# Patient Record
Sex: FEMALE | Race: BLACK OR AFRICAN AMERICAN | Employment: FULL TIME | URBAN - METROPOLITAN AREA
[De-identification: names, ages, dates, MRNs, and addresses within clinical notes are randomized per-mention and may not be internally consistent; named-entity substitution may affect disease eponyms.]

---

## 2017-01-23 RX ORDER — VALSARTAN 80 MG/1
TABLET ORAL
Qty: 30 TAB | Refills: 6 | Status: SHIPPED | OUTPATIENT
Start: 2017-01-23 | End: 2017-09-18 | Stop reason: SDUPTHER

## 2017-05-04 ENCOUNTER — OFFICE VISIT (OUTPATIENT)
Dept: CARDIOLOGY CLINIC | Age: 62
End: 2017-05-04

## 2017-05-04 VITALS
HEIGHT: 63 IN | DIASTOLIC BLOOD PRESSURE: 61 MMHG | WEIGHT: 132 LBS | HEART RATE: 70 BPM | BODY MASS INDEX: 23.39 KG/M2 | SYSTOLIC BLOOD PRESSURE: 116 MMHG

## 2017-05-04 DIAGNOSIS — Z98.61 POSTSURGICAL PERCUTANEOUS TRANSLUMINAL CORONARY ANGIOPLASTY STATUS: ICD-10-CM

## 2017-05-04 DIAGNOSIS — I10 ESSENTIAL HYPERTENSION, BENIGN: ICD-10-CM

## 2017-05-04 DIAGNOSIS — I25.10 ATHEROSCLEROSIS OF NATIVE CORONARY ARTERY OF NATIVE HEART WITHOUT ANGINA PECTORIS: Primary | ICD-10-CM

## 2017-05-04 DIAGNOSIS — E78.00 PURE HYPERCHOLESTEROLEMIA: ICD-10-CM

## 2017-05-04 DIAGNOSIS — F17.200 TOBACCO USE DISORDER: ICD-10-CM

## 2017-05-04 NOTE — PATIENT INSTRUCTIONS
Medications Discontinued During This Encounter   Medication Reason    carvedilol (COREG) 12.5 mg tablet Discontinued by Another Clinician       No orders of the defined types were placed in this encounter. Stopping Smoking: Care Instructions  Your Care Instructions  Cigarette smokers crave the nicotine in cigarettes. Giving it up is much harder than simply changing a habit. Your body has to stop craving the nicotine. It is hard to quit, but you can do it. There are many tools that people use to quit smoking. You may find that combining tools works best for you. There are several steps to quitting. First you get ready to quit. Then you get support to help you. After that, you learn new skills and behaviors to become a nonsmoker. For many people, a necessary step is getting and using medicine. Your doctor will help you set up the plan that best meets your needs. You may want to attend a smoking cessation program to help you quit smoking. When you choose a program, look for one that has proven success. Ask your doctor for ideas. You will greatly increase your chances of success if you take medicine as well as get counseling or join a cessation program.  Some of the changes you feel when you first quit tobacco are uncomfortable. Your body will miss the nicotine at first, and you may feel short-tempered and grumpy. You may have trouble sleeping or concentrating. Medicine can help you deal with these symptoms. You may struggle with changing your smoking habits and rituals. The last step is the tricky one: Be prepared for the smoking urge to continue for a time. This is a lot to deal with, but keep at it. You will feel better. Follow-up care is a key part of your treatment and safety. Be sure to make and go to all appointments, and call your doctor if you are having problems. Its also a good idea to know your test results and keep a list of the medicines you take. How can you care for yourself at home?   · Ask your family, friends, and coworkers for support. You have a better chance of quitting if you have help and support. · Join a support group, such as Nicotine Anonymous, for people who are trying to quit smoking. · Consider signing up for a smoking cessation program, such as the American Lung Association's Freedom from Smoking program.  · Set a quit date. Pick your date carefully so that it is not right in the middle of a big deadline or stressful time. Once you quit, do not even take a puff. Get rid of all ashtrays and lighters after your last cigarette. Clean your house and your clothes so that they do not smell of smoke. · Learn how to be a nonsmoker. Think about ways you can avoid those things that make you reach for a cigarette. ¨ Avoid situations that put you at greatest risk for smoking. For some people, it is hard to have a drink with friends without smoking. For others, they might skip a coffee break with coworkers who smoke. ¨ Change your daily routine. Take a different route to work or eat a meal in a different place. · Cut down on stress. Calm yourself or release tension by doing an activity you enjoy, such as reading a book, taking a hot bath, or gardening. · Talk to your doctor or pharmacist about nicotine replacement therapy, which replaces the nicotine in your body. You still get nicotine but you do not use tobacco. Nicotine replacement products help you slowly reduce the amount of nicotine you need. These products come in several forms, many of them available over-the-counter:  ¨ Nicotine patches  ¨ Nicotine gum and lozenges  ¨ Nicotine inhaler  · Ask your doctor about bupropion (Wellbutrin) or varenicline (Chantix), which are prescription medicines. They do not contain nicotine. They help you by reducing withdrawal symptoms, such as stress and anxiety. · Some people find hypnosis, acupuncture, and massage helpful for ending the smoking habit. · Eat a healthy diet and get regular exercise. Having healthy habits will help your body move past its craving for nicotine. · Be prepared to keep trying. Most people are not successful the first few times they try to quit. Do not get mad at yourself if you smoke again. Make a list of things you learned and think about when you want to try again, such as next week, next month, or next year. Where can you learn more? Go to http://velma-michelle.info/. Enter S161 in the search box to learn more about \"Stopping Smoking: Care Instructions. \"  Current as of: May 26, 2016  Content Version: 11.2  © 1722-5865 [employer]. Care instructions adapted under license by [employer] (which disclaims liability or warranty for this information). If you have questions about a medical condition or this instruction, always ask your healthcare professional. [address] any warranty or liability for your use of this information.

## 2017-05-04 NOTE — LETTER
Audimary Wilson 1955 5/4/2017 Dear Jessee Ríos MD 
 
I had the pleasure of evaluating  Ms. Lani Burris in office today. Below are the relevant portions of my assessment and plan of care. ICD-10-CM ICD-9-CM 1. Atherosclerosis of native coronary artery of native heart without angina pectoris I25.10 414.01   
 5/17; 11/16 Stable symptoms; rare angina- once in 4-5 months 2. Essential hypertension, benign I10 401.1 Controlled Patient taking valsartan HCT. This was not a number of list and so was advised to stop taking it 3. Pure hypercholesterolemia E78.00 272.0   
 12/16 LDL63; 7/15 LDL76;  
4. Tobacco use disorder F17.200 305.1 Patient advised to stop smoking to reduce future cardiovascular events. 5. Postsurgical percutaneous transluminal coronary angioplasty status Z98.61 V45.82   
 10/12 ALBA LCx Current Outpatient Prescriptions Medication Sig Dispense Refill  valsartan (DIOVAN) 80 mg tablet TAKE 1 TABLET DAILY 30 Tab 6  
 aspirin delayed-release 81 mg tablet Take 1 Tab by mouth daily. (Patient taking differently: Take 81 mg by mouth daily. 1 tablet once a week) 100 Tab prn  NITROSTAT 0.4 mg SL tablet DISSOLVE 1 TABLET UNDER TONGUE EVERY FIVE MINUTES AS NEEDED FOR CHEST PAIN FOR UP TO 3 DOSES 25 Tab 1  
 diltiazem (TAZTIA XT) 240 mg SR capsule Take 240 mg by mouth daily.  fenofibrate (LOFIBRA) 160 mg tablet Take 160 mg by mouth daily.  isosorbide mononitrate ER (IMDUR) 30 mg tablet Take  by mouth daily.  pravastatin (PRAVACHOL) 40 mg tablet Take 1 Tab by mouth nightly. 30 Tab 6  
 albuterol (VENTOLIN HFA) 90 mcg/actuation inhaler Take  by inhalation. No orders of the defined types were placed in this encounter. If you have questions, please do not hesitate to call me. I look forward to following Ms. Lani Burris along with you. Sincerely, Adam Puente MD

## 2017-05-04 NOTE — PROGRESS NOTES
1. Have you been to the ER, urgent care clinic since your last visit? Hospitalized since your last visit? No    2. Have you seen or consulted any other health care providers outside of the [address] [place] [address] since your last visit? Include any pap smears or colon screening. No     3. Since your last visit, have you had any of the following symptoms? .         4. Have you had any blood work, X-rays or cardiac testing? No              5.  Where do you normally have your labs drawn? [facility]    6. Do you need any refills today?    No

## 2017-05-04 NOTE — MR AVS SNAPSHOT
Visit Information Date & Time Provider Department Dept. Phone Encounter #  
 5/4/2017 10:45 AM Carol Jordan MD [facility] [phone] Follow-up Instructions Return in about 6 months (around 11/4/2017), or if symptoms worsen or fail to improve. Upcoming Health Maintenance Date Due Hepatitis C Screening 1955 Pneumococcal 19-64 Medium Risk (1 of 1 - PPSV23) 8/17/1972 DTaP/Tdap/Td series (1 - Tdap) 8/17/1974 PAP AKA CERVICAL CYTOLOGY 8/17/1974 BREAST CANCER SCRN MAMMOGRAM 8/17/2003 FOBT Q 1 YEAR AGE 50-75 8/17/2003 ZOSTER VACCINE AGE 60> 8/17/2013 INFLUENZA AGE 9 TO ADULT 8/1/2017 Allergies as of 5/4/2017  Review Complete On: 5/4/2017 By: Carol Jordan MD  
  
 Severity Noted Reaction Type Reactions Codeine  10/26/2012    Other (comments) States cannot remember it has been so long Current Immunizations  Never Reviewed No immunizations on file. Not reviewed this visit You Were Diagnosed With   
  
 Codes Comments Atherosclerosis of native coronary artery of native heart without angina pectoris    -  Primary ICD-10-CM: I25.10 ICD-9-CM: 414.01 5/17; 11/16 Stable symptoms; rare angina- once in 4-5 months Essential hypertension, benign     ICD-10-CM: I10 
ICD-9-CM: 401.1 Controlled Patient taking valsartan HCT. This was not a number of list and so was advised to stop taking it Pure hypercholesterolemia     ICD-10-CM: E78.00 ICD-9-CM: 272.0 12/16 LDL63; 7/15 LDL76;  
 Tobacco use disorder     ICD-10-CM: P52.205 ICD-9-CM: 305.1 Patient advised to stop smoking to reduce future cardiovascular events. Postsurgical percutaneous transluminal coronary angioplasty status     ICD-10-CM: Z98.61 ICD-9-CM: V45.82 10/12 ALBA LCx Vitals BP Pulse Height(growth percentile) Weight(growth percentile) BMI Smoking Status 116/61 70 5' 3\" (1.6 m) 132 lb (59.9 kg) 23.38 kg/m2 Current Every Day Smoker Vitals History BMI and BSA Data Body Mass Index Body Surface Area  
 23.38 kg/m 2 1.63 m 2 Preferred Pharmacy Pharmacy Name Phone [facility][id number] [address] Your Updated Medication List  
  
   
This list is accurate as of: 5/4/17 12:00 PM.  Always use your most recent med list.  
  
  
  
  
 aspirin delayed-release 81 mg tablet Take 1 Tab by mouth daily. fenofibrate 160 mg tablet Commonly known as:  LOFIBRA Take 160 mg by mouth daily. isosorbide mononitrate ER 30 mg tablet Commonly known as:  IMDUR Take  by mouth daily. NITROSTAT 0.4 mg SL tablet Generic drug:  nitroglycerin DISSOLVE 1 TABLET UNDER TONGUE EVERY FIVE MINUTES AS NEEDED FOR CHEST PAIN FOR UP TO 3 DOSES  
  
 pravastatin 40 mg tablet Commonly known as:  PRAVACHOL Take 1 Tab by mouth nightly. TAZTIA  mg SR capsule Generic drug:  diltiazem Take 240 mg by mouth daily. valsartan 80 mg tablet Commonly known as:  DIOVAN  
TAKE 1 TABLET DAILY VENTOLIN HFA 90 mcg/actuation inhaler Generic drug:  albuterol Take  by inhalation. Follow-up Instructions Return in about 6 months (around 11/4/2017), or if symptoms worsen or fail to improve. Patient Instructions Medications Discontinued During This Encounter Medication Reason  carvedilol (COREG) 12.5 mg tablet Discontinued by Another Clinician No orders of the defined types were placed in this encounter. Stopping Smoking: Care Instructions Your Care Instructions Cigarette smokers crave the nicotine in cigarettes. Giving it up is much harder than simply changing a habit. Your body has to stop craving the nicotine. It is hard to quit, but you can do it.  There are many tools that people use to quit smoking. You may find that combining tools works best for you. There are several steps to quitting. First you get ready to quit. Then you get support to help you. After that, you learn new skills and behaviors to become a nonsmoker. For many people, a necessary step is getting and using medicine. Your doctor will help you set up the plan that best meets your needs. You may want to attend a smoking cessation program to help you quit smoking. When you choose a program, look for one that has proven success. Ask your doctor for ideas. You will greatly increase your chances of success if you take medicine as well as get counseling or join a cessation program. 
Some of the changes you feel when you first quit tobacco are uncomfortable. Your body will miss the nicotine at first, and you may feel short-tempered and grumpy. You may have trouble sleeping or concentrating. Medicine can help you deal with these symptoms. You may struggle with changing your smoking habits and rituals. The last step is the tricky one: Be prepared for the smoking urge to continue for a time. This is a lot to deal with, but keep at it. You will feel better. Follow-up care is a key part of your treatment and safety. Be sure to make and go to all appointments, and call your doctor if you are having problems. Its also a good idea to know your test results and keep a list of the medicines you take. How can you care for yourself at home? · Ask your family, friends, and coworkers for support. You have a better chance of quitting if you have help and support. · Join a support group, such as Nicotine Anonymous, for people who are trying to quit smoking. · Consider signing up for a smoking cessation program, such as the American Lung Association's Freedom from Smoking program. 
· Set a quit date. Pick your date carefully so that it is not right in the middle of a big deadline or stressful time.  Once you quit, do not even take a puff. Get rid of all ashtrays and lighters after your last cigarette. Clean your house and your clothes so that they do not smell of smoke. · Learn how to be a nonsmoker. Think about ways you can avoid those things that make you reach for a cigarette. ¨ Avoid situations that put you at greatest risk for smoking. For some people, it is hard to have a drink with friends without smoking. For others, they might skip a coffee break with coworkers who smoke. ¨ Change your daily routine. Take a different route to work or eat a meal in a different place. · Cut down on stress. Calm yourself or release tension by doing an activity you enjoy, such as reading a book, taking a hot bath, or gardening. · Talk to your doctor or pharmacist about nicotine replacement therapy, which replaces the nicotine in your body. You still get nicotine but you do not use tobacco. Nicotine replacement products help you slowly reduce the amount of nicotine you need. These products come in several forms, many of them available over-the-counter: ¨ Nicotine patches ¨ Nicotine gum and lozenges ¨ Nicotine inhaler · Ask your doctor about bupropion (Wellbutrin) or varenicline (Chantix), which are prescription medicines. They do not contain nicotine. They help you by reducing withdrawal symptoms, such as stress and anxiety. · Some people find hypnosis, acupuncture, and massage helpful for ending the smoking habit. · Eat a healthy diet and get regular exercise. Having healthy habits will help your body move past its craving for nicotine. · Be prepared to keep trying. Most people are not successful the first few times they try to quit. Do not get mad at yourself if you smoke again. Make a list of things you learned and think about when you want to try again, such as next week, next month, or next year. Where can you learn more? Go to http://velma-michelle.info/. Enter N113 in the search box to learn more about \"Stopping Smoking: Care Instructions. \" Current as of: May 26, 2016 Content Version: 11.2 © 6515-8634 [employer], [employer]. Care instructions adapted under license by [employer] (which disclaims liability or warranty for this information). If you have questions about a medical condition or this instruction, always ask your healthcare professional. [address] any warranty or liability for your use of this information. Introducing [facility]! Ender Melo introduces [employer]SP Tooling patient portal. Now you can access parts of your medical record, email your doctor's office, and request medication refills online. 1. In your internet browser, go to [email or web address] ([email or web address])/[email or web address] Planet 2. Click on the First Time User? Click Here link in the Sign In box. You will see the New Member Sign Up page. 3. Enter your R[employer]ing Access Code exactly as it appears below. You will not need to use this code after youve completed the sign-up process. If you do not sign up before the expiration date, you must request a new code. · [employer] Access Code: [email or web address] Expires: 8/2/2017 10:55 AM 
 
4. Enter the last four digits of your Social Security Number (xxxx) and Date of Birth (mm/dd/yyyy) as indicated and click Submit. You will be taken to the next sign-up page. 5. Create a RSP Tooling ID. This will be your [employer] login ID and cannot be changed, so think of one that is secure and easy to remember. 6. Create a [employer] password. You can change your password at any time. 7. Enter your Password Reset Question and Answer. This can be used at a later time if you forget your password. 8. Enter your e-mail address. You will receive e-mail notification when new information is available in [address]. 9. Click Sign Up. You can now view and download portions of your medical record. 10. Click the Download Summary menu link to download a portable copy of your medical information. If you have questions, please visit the Frequently Asked Questions section of the Quidsi website. Remember, Quidsi is NOT to be used for urgent needs. For medical emergencies, dial 911. Now available from your iPhone and Android! Please provide this summary of care documentation to your next provider. Your primary care clinician is listed as Patty Gar. If you have any questions after today's visit, please call [phone].

## 2017-05-04 NOTE — PROGRESS NOTES
HISTORY OF PRESENT ILLNESS  Mal Martinez is a 59 y.o. female. HPI Comments: F/u CAD, Old PCI, HTN, HLD  5/17; 11/16  Patient denies significant chest pain, SOB, palpitations, edema, dizziness      Hypertension   The history is provided by the medical records. This is a chronic problem. Pertinent negatives include no chest pain, no headaches and no shortness of breath. Cholesterol Problem   The history is provided by the medical records. This is a chronic problem. Pertinent negatives include no chest pain, no headaches and no shortness of breath. CHF   The history is provided by the medical records. This is a chronic problem. Pertinent negatives include no chest pain, no headaches and no shortness of breath. Review of Systems   Constitutional: Negative for chills, fever, malaise/fatigue and weight loss. HENT: Negative for nosebleeds. Eyes: Negative for discharge. Respiratory: Negative for cough, shortness of breath and wheezing. Cardiovascular: Negative for chest pain, palpitations, orthopnea, claudication, leg swelling and PND. Gastrointestinal: Negative for diarrhea, nausea and vomiting. Genitourinary: Negative for dysuria and hematuria. Musculoskeletal: Negative for joint pain. Skin: Negative for rash. Neurological: Negative for dizziness, seizures, loss of consciousness and headaches. Endo/Heme/Allergies: Negative for polydipsia. Does not bruise/bleed easily. Psychiatric/Behavioral: Negative for depression and substance abuse. The patient does not have insomnia.       Allergies   Allergen Reactions    Codeine Other (comments)     States cannot remember it has been so long         Past Medical History:   Diagnosis Date    CAD (coronary artery disease)     Chronic diastolic heart failure (HCC)     Stable symptoms    Congestive heart failure, unspecified     Essential hypertension     Myocardial infarction ([facility] [address]     10/12 NSTEMI    Other and unspecified hyperlipidemia 8/12 LDL 91    Postsurgical percutaneous transluminal coronary angioplasty status     Tobacco use disorder     Weight loss, unintentional 7/27/2015    chk thyroid, cmp        Family History   Problem Relation Age of Onset    Heart Failure Mother      CHF in 45s    Stroke Mother      CVA in 46s    Heart Attack Neg Hx     Sudden Death Neg Hx        Social History   Substance Use Topics    Smoking status: Current Every Day Smoker     Packs/day: 0.50    Smokeless tobacco: Never Used      Comment: Smokes <1 pack of cigarettes per day; 10/12 - 3 cig/day    Alcohol use Yes      Comment: Occasional alcohol use;        Current Outpatient Prescriptions   Medication Sig    valsartan (DIOVAN) 80 mg tablet TAKE 1 TABLET DAILY    aspirin delayed-release 81 mg tablet Take 1 Tab by mouth daily. (Patient taking differently: Take 81 mg by mouth daily. 1 tablet once a week)    NITROSTAT 0.4 mg SL tablet DISSOLVE 1 TABLET UNDER TONGUE EVERY FIVE MINUTES AS NEEDED FOR CHEST PAIN FOR UP TO 3 DOSES    diltiazem (TAZTIA XT) 240 mg SR capsule Take 240 mg by mouth daily.  fenofibrate (LOFIBRA) 160 mg tablet Take 160 mg by mouth daily.  isosorbide mononitrate ER (IMDUR) 30 mg tablet Take  by mouth daily.  pravastatin (PRAVACHOL) 40 mg tablet Take 1 Tab by mouth nightly.  albuterol (VENTOLIN HFA) 90 mcg/actuation inhaler Take  by inhalation. No current facility-administered medications for this visit.          Past Surgical History:   Procedure Laterality Date    HX CORONARY STENT PLACEMENT      ALBA mid LCx    HX HEART CATHETERIZATION         Diagnostic Studies:  CARDIOLOGY STUDIES 10/1/2012 8/1/2012 4/1/2012   EKG Result - - SR, Poor R wave progression, no ac ST-T changes   Myocardial Perfusion Scan Result - nl scan, nl EF -   Cardiac Cath Result 70% D1, 99% mid LCx, 70% OM2, 50% distal RCA, ALBA mid LCx - -   Cardiac Cath with PCI Result ALBA LCx - -   Echocardiogram - Complete Result EF 65%, mild DD EF 65%, mild DD, trace MR -       Visit Vitals    /61    Pulse 70    Ht 5' 3\" (1.6 m)    Wt 59.9 kg (132 lb)    BMI 23.38 kg/m2       Ms. Maximiliano Hancock has a reminder for a \"due or due soon\" health maintenance. I have asked that she contact her primary care provider for follow-up on this health maintenance. Physical Exam   Constitutional: She is oriented to person, place, and time. She appears well-developed and well-nourished. No distress. HENT:   Head: Normocephalic and atraumatic. Mouth/Throat: Normal dentition. Eyes: Right eye exhibits no discharge. Left eye exhibits no discharge. No scleral icterus. Neck: Neck supple. No JVD present. Carotid bruit is not present. No thyromegaly present. Cardiovascular: Normal rate, regular rhythm, S1 normal, S2 normal, normal heart sounds and intact distal pulses. Exam reveals no gallop and no friction rub. No murmur heard. Pulmonary/Chest: Effort normal. She has wheezes (minimal b/l diffuse exp). She has no rales. Abdominal: Soft. She exhibits no mass. There is no tenderness. Musculoskeletal: She exhibits no edema. Lymphadenopathy:        Right cervical: No superficial cervical adenopathy present. Left cervical: No superficial cervical adenopathy present. Neurological: She is alert and oriented to person, place, and time. Skin: Skin is warm and dry. No rash noted. Psychiatric: She has a normal mood and affect. Her behavior is normal.       ASSESSMENT and Lazarus Koffi was seen today for coronary artery disease, hypertension and cholesterol problem. Diagnoses and all orders for this visit:    Atherosclerosis of native coronary artery of native heart without angina pectoris  Comments:  5/17; 11/16 Stable symptoms; rare angina- once in 4-5 months      Essential hypertension, benign  Comments:  Controlled  Patient taking valsartan HCT.   This was not a number of list and so was advised to stop taking it    Pure hypercholesterolemia  Comments:  12/16 LDL63; 7/15 LDL76;    Tobacco use disorder  Comments:  Patient advised to stop smoking to reduce future cardiovascular events. Postsurgical percutaneous transluminal coronary angioplasty status  Comments:  10/12 ALBA LCx          Pertinent laboratory and test data reviewed and discussed with patient. See patient instructions also for other medical advice given    Medications Discontinued During This Encounter   Medication Reason    carvedilol (COREG) 12.5 mg tablet Discontinued by Another Clinician       Follow-up Disposition:  Return in about 6 months (around 11/4/2017), or if symptoms worsen or fail to improve.

## 2017-09-18 RX ORDER — VALSARTAN 80 MG/1
TABLET ORAL
Qty: 30 TAB | Refills: 6 | Status: SHIPPED | OUTPATIENT
Start: 2017-09-18 | End: 2018-01-26 | Stop reason: SDUPTHER

## 2018-01-02 ENCOUNTER — OFFICE VISIT (OUTPATIENT)
Dept: CARDIOLOGY CLINIC | Age: 63
End: 2018-01-02

## 2018-01-02 VITALS
WEIGHT: 135 LBS | HEART RATE: 70 BPM | DIASTOLIC BLOOD PRESSURE: 66 MMHG | HEIGHT: 63 IN | SYSTOLIC BLOOD PRESSURE: 176 MMHG | BODY MASS INDEX: 23.92 KG/M2

## 2018-01-02 DIAGNOSIS — I50.32 CHRONIC DIASTOLIC HEART FAILURE (HCC): Primary | ICD-10-CM

## 2018-01-02 DIAGNOSIS — I25.10 ATHEROSCLEROSIS OF NATIVE CORONARY ARTERY OF NATIVE HEART WITHOUT ANGINA PECTORIS: ICD-10-CM

## 2018-01-02 DIAGNOSIS — F17.200 TOBACCO USE DISORDER: ICD-10-CM

## 2018-01-02 DIAGNOSIS — Z98.61 POSTSURGICAL PERCUTANEOUS TRANSLUMINAL CORONARY ANGIOPLASTY STATUS: ICD-10-CM

## 2018-01-02 DIAGNOSIS — E78.00 PURE HYPERCHOLESTEROLEMIA: ICD-10-CM

## 2018-01-02 DIAGNOSIS — I10 ESSENTIAL HYPERTENSION, BENIGN: ICD-10-CM

## 2018-01-02 RX ORDER — BUPROPION HYDROCHLORIDE 150 MG/1
150 TABLET, EXTENDED RELEASE ORAL 2 TIMES DAILY
Qty: 60 TAB | Refills: 2 | Status: SHIPPED | OUTPATIENT
Start: 2018-01-02 | End: 2018-07-13 | Stop reason: ALTCHOICE

## 2018-01-02 RX ORDER — IBUPROFEN 800 MG/1
TABLET ORAL
COMMUNITY
End: 2018-07-13

## 2018-01-02 RX ORDER — DICLOFENAC SODIUM 100 MG/1
100 TABLET, FILM COATED, EXTENDED RELEASE ORAL DAILY
COMMUNITY
End: 2018-01-26

## 2018-01-02 RX ORDER — FUROSEMIDE 20 MG/1
TABLET ORAL AS NEEDED
COMMUNITY
End: 2018-07-13 | Stop reason: ALTCHOICE

## 2018-01-02 RX ORDER — IBUPROFEN 200 MG
1 TABLET ORAL EVERY 24 HOURS
COMMUNITY
End: 2018-07-13

## 2018-01-02 NOTE — PROGRESS NOTES
1. Have you been to the ER, urgent care clinic since your last visit? Hospitalized since your last visit? Yes Where: [facility]/Shortness of breath    2. Have you seen or consulted any other health care providers outside of the [facility] since your last visit? Include any pap smears or colon screening. No     3. Since your last visit, have you had any of the following symptoms? shortness of breath. 4.  Have you had any blood work, X-rays or cardiac testing? Yes Where: [facility] Reason for visit: Labs             5.  Where do you normally have your labs drawn? [facility]    6. Do you need any refills today?    No

## 2018-01-02 NOTE — LETTER
Mago Booker 1955 1/2/2018 Dear Lisa Noguera MD 
 
I had the pleasure of evaluating  Ms. Pal Bee in office today. Below are the relevant portions of my assessment and plan of care. ICD-10-CM ICD-9-CM 1. Chronic diastolic heart failure (HCC) I50.32 428.32 2D ECHO COMPLETE ADULT (TTE) SCHEDULE NUCLEAR STUDY  
 recent er visit with increase sob 
better after that 2. Atherosclerosis of native coronary artery of native heart without angina pectoris I25.10 414.01 2D ECHO COMPLETE ADULT (TTE) SCHEDULE NUCLEAR STUDY  
 stab;e 
exertional 
sob 3. Essential hypertension, benign I10 401.1   
 elevated 
monitor at home 4. Pure hypercholesterolemia E78.00 272.0   
 on statin 5. Postsurgical percutaneous transluminal coronary angioplasty status Z98.61 V45.82   
6. Tobacco use disorder F17.200 305.1   
 discussed cessation Current Outpatient Prescriptions Medication Sig Dispense Refill  furosemide (LASIX) 20 mg tablet Take  by mouth daily.  nicotine (NICODERM CQ) 14 mg/24 hr patch 1 Patch by TransDERmal route every twenty-four (24) hours.  ibuprofen (MOTRIN) 800 mg tablet Take  by mouth.  diclofenac (VOLTAREN-XR) 100 mg ER tablet Take 100 mg by mouth daily.  buPROPion SR (WELLBUTRIN SR) 150 mg SR tablet Take 1 Tab by mouth two (2) times a day. 60 Tab 2  
 valsartan (DIOVAN) 80 mg tablet TAKE 1 TABLET BY MOUTH DAILY 30 Tab 6  
 aspirin delayed-release 81 mg tablet Take 1 Tab by mouth daily. 100 Tab prn  NITROSTAT 0.4 mg SL tablet DISSOLVE 1 TABLET UNDER TONGUE EVERY FIVE MINUTES AS NEEDED FOR CHEST PAIN FOR UP TO 3 DOSES 25 Tab 1  
 diltiazem (TAZTIA XT) 240 mg SR capsule Take 240 mg by mouth daily.  fenofibrate (LOFIBRA) 160 mg tablet Take 160 mg by mouth daily.  isosorbide mononitrate ER (IMDUR) 30 mg tablet Take  by mouth daily.  pravastatin (PRAVACHOL) 40 mg tablet Take 1 Tab by mouth nightly. 30 Tab 6  albuterol (VENTOLIN HFA) 90 mcg/actuation inhaler Take  by inhalation. Orders Placed This Encounter  SCHEDULE NUCLEAR STUDY  
  lexiscan stress test  
  Standing Status:   Future Standing Expiration Date:   2019  2D ECHO COMPLETE ADULT (TTE) Standing Status:   Future Standing Expiration Date:   2018 Order Specific Question:   Reason for Exam: Answer:   see diagnosis  furosemide (LASIX) 20 mg tablet Sig: Take  by mouth daily.  nicotine (NICODERM CQ) 14 mg/24 hr patch Si Patch by TransDERmal route every twenty-four (24) hours.  ibuprofen (MOTRIN) 800 mg tablet Sig: Take  by mouth.  diclofenac (VOLTAREN-XR) 100 mg ER tablet Sig: Take 100 mg by mouth daily.  buPROPion SR (WELLBUTRIN SR) 150 mg SR tablet Sig: Take 1 Tab by mouth two (2) times a day. Dispense:  60 Tab Refill:  2 If you have questions, please do not hesitate to call me. I look forward to following MsBarbie Darrel Kerr along with you. Sincerely, Carvin Ganser, MD

## 2018-01-02 NOTE — MR AVS SNAPSHOT
Visit Information Date & Time Provider Department Dept. Phone Encounter #  
 1/2/2018  9:15 AM Jeanette Palacios MD [facility] [phone] [id number] Follow-up Instructions Return for follow up with Dr Nancy Jon, F/u after tests. Your Appointments 1/2/2018  9:15 AM  
Follow Up with Jeanette Palacios MD  
[facility] ([facility]) Appt Note: h/f sentara for sob, chf patient of dr. Gavino Almanza need see doctor soon; h/f sentara for sob, chf patient of dr. Gavino Almanza need see doctor soon [address] [facility] [address]  
  
   
 [address] [place] [address]  
  
    
 1/15/2018  8:00 AM  
PROCEDURE with CA NUC [facility] ([facility]) Appt Note: deonna/echo jarvis [address] [facility] [address]  
  
   
 [address] [facility]  
  
    
 1/15/2018  8:30 AM  
PROCEDURE with CA ECHO [facility] ([facility]) Appt Note: echo/deonna/jarvis [address] [facility] [address]  
  
   
 [address] [place] [address] Upcoming Health Maintenance Date Due Hepatitis C Screening 1955 Pneumococcal 19-64 Medium Risk (1 of 1 - PPSV23) 8/17/1972 DTaP/Tdap/Td series (1 - Tdap) 8/17/1974 PAP AKA CERVICAL CYTOLOGY 8/17/1974 FOBT Q 1 YEAR AGE 50-75 8/17/2003 ZOSTER VACCINE AGE 60> 6/17/2013 Influenza Age 5 to Adult 8/1/2017 Allergies as of 1/2/2018  Review Complete On: 1/2/2018 By: Jeanette Palacios MD  
  
 Severity Noted Reaction Type Reactions Codeine  10/26/2012    Other (comments) States cannot remember it has been so long Current Immunizations  Never Reviewed No immunizations on file. Not reviewed this visit You Were Diagnosed With   
  
 Codes Comments  Chronic diastolic heart failure (HCC)    -  Primary ICD-10-CM: I50.32 
 ICD-9-CM: 428.32 recent er visit with increase sob 
better after that Atherosclerosis of native coronary artery of native heart without angina pectoris     ICD-10-CM: I25.10 ICD-9-CM: 414.01 stab;e 
exertional 
sob Essential hypertension, benign     ICD-10-CM: I10 
ICD-9-CM: 401.1 elevated 
monitor at home Pure hypercholesterolemia     ICD-10-CM: E78.00 ICD-9-CM: 272.0 on statin Postsurgical percutaneous transluminal coronary angioplasty status     ICD-10-CM: Z98.61 ICD-9-CM: V45.82 Tobacco use disorder     ICD-10-CM: F17.200 ICD-9-CM: 305.1 discussed cessation Vitals BP Pulse Height(growth percentile) Weight(growth percentile) BMI Smoking Status 176/66 70 5' 3\" (1.6 m) 135 lb (61.2 kg) 23.91 kg/m2 Current Every Day Smoker Vitals History BMI and BSA Data Body Mass Index Body Surface Area  
 23.91 kg/m 2 1.65 m 2 Preferred Pharmacy Pharmacy Name Phone [id number] [address] [place] [address] Your Updated Medication List  
  
   
This list is accurate as of: 1/2/18  8:53 AM.  Always use your most recent med list.  
  
  
  
  
 aspirin delayed-release 81 mg tablet Take 1 Tab by mouth daily. buPROPion  mg SR tablet Commonly known as:  Rosalie [place] Take 1 Tab by mouth two (2) times a day. fenofibrate 160 mg tablet Commonly known as:  LOFIBRA Take 160 mg by mouth daily. furosemide 20 mg tablet Commonly known as:  LASIX Take  by mouth daily. ibuprofen 800 mg tablet Commonly known as:  MOTRIN Take  by mouth.  
  
 isosorbide mononitrate ER 30 mg tablet Commonly known as:  IMDUR Take  by mouth daily. nicotine 14 mg/24 hr patch Commonly known as:  NICODERM CQ  
1 Patch by TransDERmal route every twenty-four (24) hours. NITROSTAT 0.4 mg SL tablet Generic drug:  nitroglycerin DISSOLVE 1 TABLET UNDER TONGUE EVERY FIVE MINUTES AS NEEDED FOR CHEST PAIN FOR UP TO 3 DOSES  
  
 pravastatin 40 mg tablet Commonly known as:  PRAVACHOL Take 1 Tab by mouth nightly. TAZTIA  mg SR capsule Generic drug:  diltiazem Take 240 mg by mouth daily. valsartan 80 mg tablet Commonly known as:  DIOVAN  
TAKE 1 TABLET BY MOUTH DAILY VENTOLIN HFA 90 mcg/actuation inhaler Generic drug:  albuterol Take  by inhalation. VOLTAREN- mg ER tablet Generic drug:  diclofenac Take 100 mg by mouth daily. Prescriptions Sent to Pharmacy Refills buPROPion SR (WELLBUTRIN SR) 150 mg SR tablet 2 Sig: Take 1 Tab by mouth two (2) times a day. Class: Normal  
 Pharmacy: [facility] [address] [place] [address], [address] [place] [address] #: [phone] Route: Oral  
  
Follow-up Instructions Return for follow up with Dr Kaye Aleman, F/u after tests. To-Do List   
 01/05/2018 Cardiac Services:  2D ECHO COMPLETE ADULT (TTE) 01/09/2018 Nursing:  SCHEDULE NUCLEAR STUDY Introducing [facility]! [facility] introduces [employer] patient portal. Now you can access parts of your medical record, email your doctor's office, and request medication refills online. 1. In your internet browser, go to [email or web address] 2. Click on the First Time User? Click Here link in the Sign In box. You will see the New Member Sign Up page. 3. Enter your [employer] Access Code exactly as it appears below. You will not need to use this code after youve completed the sign-up process. If you do not sign up before the expiration date, you must request a new code. · [employer] Access Code: [email or web address] Expires: 4/2/2018  8:32 AM 
 
4. Enter the last four digits of your Social Security Number (xxxx) and Date of Birth (mm/dd/yyyy) as indicated and click Submit. You will be taken to the next sign-up page. 5. Create a [employer] ID.  This will be your [employer] login ID and cannot be changed, so think of one that is secure and easy to remember. 6. Create a [employer] password. You can change your password at any time. 7. Enter your Password Reset Question and Answer. This can be used at a later time if you forget your password. 8. Enter your e-mail address. You will receive e-mail notification when new information is available in [address] 9. Click Sign Up. You can now view and download portions of your medical record. 10. Click the Download Summary menu link to download a portable copy of your medical information. If you have questions, please visit the Frequently Asked Questions section of the [employer] website. Remember, [employer] is NOT to be used for urgent needs. For medical emergencies, dial 911. Now available from your iPhone and Android! Please provide this summary of care documentation to your next provider. Your primary care clinician is listed as Rayma Duane. If you have any questions after today's visit, please call [phone].

## 2018-01-02 NOTE — PROGRESS NOTES
HISTORY OF PRESENT ILLNESS  Zaida Reynaga is a 57 y.o. female. HPI Comments: F/u CAD, Old PCI, HTN, HLD  5/17; 11/16  Patient denies significant chest pain, SOB, palpitations, edema, dizziness      Hospital Follow Up   The history is provided by the patient. This is a new problem. Associated symptoms include shortness of breath. Pertinent negatives include no chest pain and no headaches. Hypertension   The history is provided by the medical records. This is a chronic problem. Associated symptoms include shortness of breath. Pertinent negatives include no chest pain and no headaches. Cholesterol Problem   The history is provided by the medical records. This is a chronic problem. Associated symptoms include shortness of breath. Pertinent negatives include no chest pain and no headaches. Shortness of Breath   The history is provided by the patient. This is a new problem. The problem occurs intermittently. The problem has been gradually worsening. Pertinent negatives include no fever, no headaches, no cough, no wheezing, no PND, no orthopnea, no chest pain, no vomiting, no rash, no leg swelling and no claudication. The problem's precipitants include exercise. CHF   The history is provided by the medical records. This is a chronic problem. Associated symptoms include shortness of breath. Pertinent negatives include no chest pain and no headaches. Review of Systems   Constitutional: Negative for chills, fever, malaise/fatigue and weight loss. HENT: Negative for nosebleeds. Eyes: Negative for discharge. Respiratory: Positive for shortness of breath. Negative for cough and wheezing. Cardiovascular: Negative for chest pain, palpitations, orthopnea, claudication, leg swelling and PND. Gastrointestinal: Negative for diarrhea, nausea and vomiting. Genitourinary: Negative for dysuria and hematuria. Musculoskeletal: Negative for joint pain. Skin: Negative for rash.    Neurological: Negative for dizziness, seizures, loss of consciousness and headaches. Endo/Heme/Allergies: Negative for polydipsia. Does not bruise/bleed easily. Psychiatric/Behavioral: Negative for depression and substance abuse. The patient does not have insomnia. Allergies   Allergen Reactions    Codeine Other (comments)     States cannot remember it has been so long         Past Medical History:   Diagnosis Date    CAD (coronary artery disease)     Chronic diastolic heart failure (HCC)     Stable symptoms    Congestive heart failure, unspecified     Essential hypertension     Myocardial infarction     10/12 NSTEMI    Other and unspecified hyperlipidemia     8/12 LDL 91    Postsurgical percutaneous transluminal coronary angioplasty status     Tobacco use disorder     Weight loss, unintentional 7/27/2015    chk thyroid, cmp        Family History   Problem Relation Age of Onset    Heart Failure Mother      CHF in 45s    Stroke Mother      CVA in 46s    Heart Attack Neg Hx     Sudden Death Neg Hx        Social History   Substance Use Topics    Smoking status: Current Every Day Smoker     Packs/day: 0.50    Smokeless tobacco: Never Used      Comment: Smokes <1 pack of cigarettes per day; 10/12 - 3 cig/day    Alcohol use Yes      Comment: Occasional alcohol use;        Current Outpatient Prescriptions   Medication Sig    furosemide (LASIX) 20 mg tablet Take  by mouth daily.  nicotine (NICODERM CQ) 14 mg/24 hr patch 1 Patch by TransDERmal route every twenty-four (24) hours.  ibuprofen (MOTRIN) 800 mg tablet Take  by mouth.  diclofenac (VOLTAREN-XR) 100 mg ER tablet Take 100 mg by mouth daily.  buPROPion SR (WELLBUTRIN SR) 150 mg SR tablet Take 1 Tab by mouth two (2) times a day.  valsartan (DIOVAN) 80 mg tablet TAKE 1 TABLET BY MOUTH DAILY    aspirin delayed-release 81 mg tablet Take 1 Tab by mouth daily.     NITROSTAT 0.4 mg SL tablet DISSOLVE 1 TABLET UNDER TONGUE EVERY FIVE MINUTES AS NEEDED FOR CHEST PAIN FOR UP TO 3 DOSES    diltiazem (TAZTIA XT) 240 mg SR capsule Take 240 mg by mouth daily.  fenofibrate (LOFIBRA) 160 mg tablet Take 160 mg by mouth daily.  isosorbide mononitrate ER (IMDUR) 30 mg tablet Take  by mouth daily.  pravastatin (PRAVACHOL) 40 mg tablet Take 1 Tab by mouth nightly.  albuterol (VENTOLIN HFA) 90 mcg/actuation inhaler Take  by inhalation. No current facility-administered medications for this visit. Past Surgical History:   Procedure Laterality Date    HX CORONARY STENT PLACEMENT      ALBA mid LCx    HX HEART CATHETERIZATION         Diagnostic Studies:  CARDIOLOGY STUDIES 10/1/2012 8/1/2012 4/1/2012   EKG Result - - SR, Poor R wave progression, no ac ST-T changes   Myocardial Perfusion Scan Result - nl scan, nl EF -   Cardiac Cath Result 70% D1, 99% mid LCx, 70% OM2, 50% distal RCA, ALBA mid LCx - -   Cardiac Cath with PCI Result ALBA LCx - -   Echocardiogram - Complete Result EF 65%, mild DD EF 65%, mild DD, trace MR -   Some recent data might be hidden       Visit Vitals    /66    Pulse 70    Ht 5' 3\" (1.6 m)    Wt 61.2 kg (135 lb)    BMI 23.91 kg/m2       Ms. Marshall Trinh has a reminder for a \"due or due soon\" health maintenance. I have asked that she contact her primary care provider for follow-up on this health maintenance. Physical Exam   Constitutional: She is oriented to person, place, and time. She appears well-developed and well-nourished. No distress. HENT:   Head: Normocephalic and atraumatic. Mouth/Throat: Normal dentition. Eyes: Right eye exhibits no discharge. Left eye exhibits no discharge. No scleral icterus. Neck: Neck supple. No JVD present. Carotid bruit is not present. No thyromegaly present. Cardiovascular: Normal rate, regular rhythm, S1 normal, S2 normal, normal heart sounds and intact distal pulses. Exam reveals no gallop and no friction rub. No murmur heard.   Pulmonary/Chest: Effort normal. She has wheezes (minimal b/l diffuse exp). She has no rales. Abdominal: Soft. She exhibits no mass. There is no tenderness. Musculoskeletal: She exhibits no edema. Lymphadenopathy:        Right cervical: No superficial cervical adenopathy present. Left cervical: No superficial cervical adenopathy present. Neurological: She is alert and oriented to person, place, and time. Skin: Skin is warm and dry. No rash noted. Psychiatric: She has a normal mood and affect. Her behavior is normal.       ASSESSMENT and PLAN        Diagnoses and all orders for this visit:    1. Chronic diastolic heart failure (HCC)  Comments:  recent er visit with increase sob  better after that  Orders:  -     2D ECHO COMPLETE ADULT (TTE); Future  -     SCHEDULE NUCLEAR STUDY; Future    2. Atherosclerosis of native coronary artery of native heart without angina pectoris  Comments:  stab;e  exertional  sob  Orders:  -     2D ECHO COMPLETE ADULT (TTE); Future  -     SCHEDULE NUCLEAR STUDY; Future    3. Essential hypertension, benign  Comments:  elevated  monitor at home    4. Pure hypercholesterolemia  Comments:  on statin    5. Postsurgical percutaneous transluminal coronary angioplasty status    6. Tobacco use disorder  Comments:  discussed cessation    Other orders  -     buPROPion SR (WELLBUTRIN SR) 150 mg SR tablet; Take 1 Tab by mouth two (2) times a day. Pertinent laboratory and test data reviewed and discussed with patient. See patient instructions also for other medical advice given    There are no discontinued medications. Follow-up Disposition:  Return for follow up with Dr Pat Fernandez, F/u after tests.

## 2018-01-15 ENCOUNTER — CLINICAL SUPPORT (OUTPATIENT)
Dept: CARDIOLOGY CLINIC | Age: 63
End: 2018-01-15

## 2018-01-15 DIAGNOSIS — I25.10 ATHEROSCLEROSIS OF NATIVE CORONARY ARTERY OF NATIVE HEART WITHOUT ANGINA PECTORIS: ICD-10-CM

## 2018-01-15 DIAGNOSIS — I50.32 CHRONIC DIASTOLIC HEART FAILURE (HCC): Primary | ICD-10-CM

## 2018-01-15 DIAGNOSIS — I10 ESSENTIAL HYPERTENSION, BENIGN: ICD-10-CM

## 2018-01-15 DIAGNOSIS — I50.32 CHRONIC DIASTOLIC HEART FAILURE (HCC): ICD-10-CM

## 2018-01-15 NOTE — PROGRESS NOTES
[facility]  [address], [address] [place] [address], [place], [address] [place] [address]  [phone] [place]  [phone] [place]       Name: Portia Washington         MRN#: [id number]        YOB: 1955   Gender: female Ht:5'3\" Wt:135 lbs       . Date of Rest/Stress Images: 1/15/2018   Referring Physician: Chen Carmen MD  Ordering Physician: John Tamez. Stefano Ann MD, [facility]  Technologist: Fidel Delgado. JALEEL Sparrow., C.N.M.T  Diagnosis:  1. Chronic diastolic heart failure ([address]    2. Atherosclerosis of native coronary artery of native heart without angina pectoris    3. Essential hypertension, benign          Rest/Stress Myoview SPECT Myocardial Perfusion Imaging with  Lexiscan Stress and gated SPECT Imaging      PROCEDURE:      Myocardial perfusion imaging was performed at rest approximately 30 mins following the intravenous injection,(Right hand ) of 11.9 mCi of Tc99m Myoview for evaluation of myocardial function and perfusion at rest.    Baseline Data:    Baseline EKG reveals sinus rhythm, nonspecific ST-T changes. Baseline heart rate is 69. Baseline blood pressure is 160/74. Procedure: The patient was injected with 0.4 mg IV Lexiscan. The patient had no significant symptoms. The patient had shortness of breath and nausea and received 100 mg of IV Aminophylline. Heart rate increased from baseline to a heart rate of 101. Blood pressure decreased to 138/70. Electrocardiogram showed no significant ST-T changes with occasional PVCs. Diagnosis:   1. Nondiagnostic EKG portion of Lexiscan stress test.    2. Nuclear imaging report to follow. Pharmacological:  Patient was injected with . 4 mg/mL with Lexiscan intravenously over a period 10 to 20 sec. After pharmacologic stress, the patient was injected intravenously with 36.1 mCi of Tc99m Myoview.  Gating post stress tomographic imaging performed approximately 45 minutes post tracer injection. The data was reconstructed in the short, horizontal long and vertical long axis views and tomographic slices were generated. NUCLEAR IMAGING:    Findings:   1. Stress images reveal normal Myoview distrubution in all the LV segments in short axis, vertical and horizontal long axis views. 2. Resting images have a normal uptake. 3. Gated images reveal normal wall motion and the ejection fraction is calculated to be 85%. Conclusion:   1. Normal perfusion scan. 2. Normal wall motion and ejection fraction. 3. No evidence of significant fixed or reversible defect suggesting ischemia or myocardial infarction noted from this nuclear study. 4. Low risk scan. Thank you for the referral.    E-signed and Interpreting Physician:    Joel Stoner.  Gustabo Mitchell MD, [facility]     Date of interpretation: 1/15/2018  Date of final report: ,td

## 2018-01-26 ENCOUNTER — OFFICE VISIT (OUTPATIENT)
Dept: CARDIOLOGY CLINIC | Age: 63
End: 2018-01-26

## 2018-01-26 VITALS
BODY MASS INDEX: 23.39 KG/M2 | HEIGHT: 63 IN | SYSTOLIC BLOOD PRESSURE: 151 MMHG | DIASTOLIC BLOOD PRESSURE: 69 MMHG | WEIGHT: 132 LBS | HEART RATE: 66 BPM

## 2018-01-26 DIAGNOSIS — Z98.61 POSTSURGICAL PERCUTANEOUS TRANSLUMINAL CORONARY ANGIOPLASTY STATUS: ICD-10-CM

## 2018-01-26 DIAGNOSIS — F17.200 TOBACCO USE DISORDER: ICD-10-CM

## 2018-01-26 DIAGNOSIS — E78.00 PURE HYPERCHOLESTEROLEMIA: ICD-10-CM

## 2018-01-26 DIAGNOSIS — I10 ESSENTIAL HYPERTENSION, BENIGN: ICD-10-CM

## 2018-01-26 DIAGNOSIS — I25.10 ATHEROSCLEROSIS OF NATIVE CORONARY ARTERY OF NATIVE HEART WITHOUT ANGINA PECTORIS: ICD-10-CM

## 2018-01-26 DIAGNOSIS — I50.32 CHRONIC DIASTOLIC HEART FAILURE (HCC): Primary | ICD-10-CM

## 2018-01-26 RX ORDER — VALSARTAN 160 MG/1
TABLET ORAL DAILY
COMMUNITY

## 2018-01-26 RX ORDER — ISOSORBIDE MONONITRATE 60 MG/1
60 TABLET, EXTENDED RELEASE ORAL
Qty: 90 TAB | Refills: 1 | Status: SHIPPED | OUTPATIENT
Start: 2018-01-26 | End: 2022-03-04 | Stop reason: DRUGHIGH

## 2018-01-26 NOTE — PROGRESS NOTES
1. Have you been to the ER, urgent care clinic since your last visit? Hospitalized since your last visit? No    2. Have you seen or consulted any other health care providers outside of the [address] [place] [address] since your last visit? Include any pap smears or colon screening. No     3. Since your last visit, have you had any of the following symptoms? .           4. Have you had any blood work, X-rays or cardiac testing? No             5.  Where do you normally have your labs drawn? [facility]    6. Do you need any refills today?    No

## 2018-01-26 NOTE — MR AVS SNAPSHOT
[address] [place] [address] 
 
 
 [address] [facility] 
[phone] Patient: Bettye Topete MRN: [id number] JYM:[id number][birth year]00/[birth year]98 Visit Information Date & Time Provider Department Dept. Phone Encounter #  
 1/26/2018  9:15 AM Brittney Echeverria MD [facility] [phone] Follow-up Instructions Return in about 6 months (around 7/26/2018), or if symptoms worsen or fail to improve. Upcoming Health Maintenance Date Due Hepatitis C Screening 1955 Pneumococcal 19-64 Medium Risk (1 of 1 - PPSV23) 8/17/1972 DTaP/Tdap/Td series (1 - Tdap) 8/17/1974 PAP AKA CERVICAL CYTOLOGY 8/17/1974 BREAST CANCER SCRN MAMMOGRAM 8/17/2003 FOBT Q 1 YEAR AGE 50-75 8/17/2003 ZOSTER VACCINE AGE 60> 6/17/2013 Influenza Age 5 to Adult 8/1/2017 Allergies as of 1/26/2018  Review Complete On: 1/26/2018 By: Brittney Echeverria MD  
  
 Severity Noted Reaction Type Reactions Codeine  10/26/2012    Other (comments) States cannot remember it has been so long Current Immunizations  Never Reviewed No immunizations on file. Not reviewed this visit You Were Diagnosed With   
  
 Codes Comments Chronic diastolic heart failure ([facility] [address]    -  Primary ICD-10-CM: I50.32 
ICD-9-CM: 428.32 1/18 improved clinically Pure hypercholesterolemia     ICD-10-CM: E78.00 ICD-9-CM: 272.0 5/17 LDL56; 12/16 TVM68; 7/15 LDL76; 5/14 LDL 96;  8/12 LDL 91; 
  
 Essential hypertension, benign     ICD-10-CM: I10 
ICD-9-CM: 401.1 1/18 incr imdur; 5/17 Patient taking valsartan HCT. This was not a number of list and so was advised to stop taking it; Controlled Atherosclerosis of native coronary artery of native heart without angina pectoris     ICD-10-CM: I25.10 ICD-9-CM: 414.01 1/18; 5/17; 11/16 Stable symptoms Postsurgical percutaneous transluminal coronary angioplasty status     ICD-10-CM: Z98.61 ICD-9-CM: V45.82 10/12 ALBA LCx Tobacco use disorder     ICD-10-CM: F17.200 ICD-9-CM: 305.1 Patient advised to stop smoking to reduce future cardiovascular events. Vitals BP Pulse Height(growth percentile) Weight(growth percentile) BMI Smoking Status 151/69 66 5' 3\" (1.6 m) 132 lb (59.9 kg) 23.38 kg/m2 Current Every Day Smoker Vitals History BMI and BSA Data Body Mass Index Body Surface Area  
 23.38 kg/m 2 1.63 m 2 Preferred Pharmacy Pharmacy Name Phone [facility] #[id number] Viviane Pizano [place] [address] Your Updated Medication List  
  
   
This list is accurate as of: 1/26/18  9:55 AM.  Always use your most recent med list.  
  
  
  
  
 aspirin delayed-release 81 mg tablet Take 1 Tab by mouth daily. buPROPion  mg SR tablet Commonly known as:  Michael Dowdy Take 1 Tab by mouth two (2) times a day. fenofibrate 160 mg tablet Commonly known as:  LOFIBRA Take 160 mg by mouth daily. furosemide 20 mg tablet Commonly known as:  LASIX Take  by mouth daily. ibuprofen 800 mg tablet Commonly known as:  MOTRIN Take  by mouth.  
  
 isosorbide mononitrate ER 60 mg CR tablet Commonly known as:  IMDUR Take 1 Tab by mouth every morning. nicotine 14 mg/24 hr patch Commonly known as:  NICODERM CQ  
1 Patch by TransDERmal route every twenty-four (24) hours. NITROSTAT 0.4 mg SL tablet Generic drug:  nitroglycerin DISSOLVE 1 TABLET UNDER TONGUE EVERY FIVE MINUTES AS NEEDED FOR CHEST PAIN FOR UP TO 3 DOSES  
  
 pravastatin 40 mg tablet Commonly known as:  PRAVACHOL Take 1 Tab by mouth nightly. TAZTIA  mg SR capsule Generic drug:  diltiazem Take 240 mg by mouth daily. valsartan 160 mg tablet Commonly known as:  DIOVAN Take  by mouth daily. VENTOLIN HFA 90 mcg/actuation inhaler Generic drug:  albuterol Take  by inhalation. Prescriptions Sent to Pharmacy Refills isosorbide mononitrate ER (IMDUR) 60 mg CR tablet 1 Sig: Take 1 Tab by mouth every morning. Class: Normal  
 Pharmacy: [facility] [address] [place] [address], [address] [place] [address] #: [phone] Route: Oral  
  
Follow-up Instructions Return in about 6 months (around 7/26/2018), or if symptoms worsen or fail to improve. Patient Instructions Medications Discontinued During This Encounter Medication Reason  valsartan (DIOVAN) 80 mg tablet Duplicate Order  diclofenac (VOLTAREN-XR) 100 mg ER tablet Not A Current Medication  isosorbide mononitrate ER (IMDUR) 30 mg tablet Reorder Orders Placed This Encounter  isosorbide mononitrate ER (IMDUR) 60 mg CR tablet Sig: Take 1 Tab by mouth every morning. Dispense:  90 Tab Refill:  1 Stopping Smoking: Care Instructions Your Care Instructions Cigarette smokers crave the nicotine in cigarettes. Giving it up is much harder than simply changing a habit. Your body has to stop craving the nicotine. It is hard to quit, but you can do it. There are many tools that people use to quit smoking. You may find that combining tools works best for you. There are several steps to quitting. First you get ready to quit. Then you get support to help you. After that, you learn new skills and behaviors to become a nonsmoker. For many people, a necessary step is getting and using medicine. Your doctor will help you set up the plan that best meets your needs. You may want to attend a smoking cessation program to help you quit smoking. When you choose a program, look for one that has proven success. Ask your doctor for ideas. You will greatly increase your chances of success if you take medicine as well as get counseling or join a cessation program. 
Some of the changes you feel when you first quit tobacco are uncomfortable.  Your body will miss the nicotine at first, and you may feel short-tempered and grumpy. You may have trouble sleeping or concentrating. Medicine can help you deal with these symptoms. You may struggle with changing your smoking habits and rituals. The last step is the tricky one: Be prepared for the smoking urge to continue for a time. This is a lot to deal with, but keep at it. You will feel better. Follow-up care is a key part of your treatment and safety. Be sure to make and go to all appointments, and call your doctor if you are having problems. It's also a good idea to know your test results and keep a list of the medicines you take. How can you care for yourself at home? · Ask your family, friends, and coworkers for support. You have a better chance of quitting if you have help and support. · Join a support group, such as Nicotine Anonymous, for people who are trying to quit smoking. · Consider signing up for a smoking cessation program, such as the American Lung Association's Freedom from Smoking program. 
· Set a quit date. Pick your date carefully so that it is not right in the middle of a big deadline or stressful time. Once you quit, do not even take a puff. Get rid of all ashtrays and lighters after your last cigarette. Clean your house and your clothes so that they do not smell of smoke. · Learn how to be a nonsmoker. Think about ways you can avoid those things that make you reach for a cigarette. ¨ Avoid situations that put you at greatest risk for smoking. For some people, it is hard to have a drink with friends without smoking. For others, they might skip a coffee break with coworkers who smoke. ¨ Change your daily routine. Take a different route to work or eat a meal in a different place. · Cut down on stress. Calm yourself or release tension by doing an activity you enjoy, such as reading a book, taking a hot bath, or gardening.  
· Talk to your doctor or pharmacist about nicotine replacement therapy, which replaces the nicotine in your body. You still get nicotine but you do not use tobacco. Nicotine replacement products help you slowly reduce the amount of nicotine you need. These products come in several forms, many of them available over-the-counter: ¨ Nicotine patches ¨ Nicotine gum and lozenges ¨ Nicotine inhaler · Ask your doctor about bupropion (Wellbutrin) or varenicline (Chantix), which are prescription medicines. They do not contain nicotine. They help you by reducing withdrawal symptoms, such as stress and anxiety. · Some people find hypnosis, acupuncture, and massage helpful for ending the smoking habit. · Eat a healthy diet and get regular exercise. Having healthy habits will help your body move past its craving for nicotine. · Be prepared to keep trying. Most people are not successful the first few times they try to quit. Do not get mad at yourself if you smoke again. Make a list of things you learned and think about when you want to try again, such as next week, next month, or next year. Where can you learn more? Go to http://velma-michelle.info/. Enter U086 in the search box to learn more about \"Stopping Smoking: Care Instructions. \" Current as of: March 20, 2017 Content Version: 11.4 © 5606-4092 [employer]. Care instructions adapted under license by [employer] (which disclaims liability or warranty for this information). If you have questions about a medical condition or this instruction, always ask your healthcare professional. [place] [address] any warranty or liability for your use of this information. Introducing [facility]! [employer] introduces Contractually patient portal. Now you can access parts of your medical record, email your doctor's office, and request medication refills online. 1. In your internet browser, go to [email or web address] 2. Click on the First Time User? Click Here link in the Sign In box. You will see the New Member Sign Up page. 3. Enter your GoTunes Access Code exactly as it appears below. You will not need to use this code after youve completed the sign-up process. If you do not sign up before the expiration date, you must request a new code. · GoTunes Access Code: [email or web address] Expires: 4/2/2018  8:32 AM 
 
4. Enter the last four digits of your Social Security Number (xxxx) and Date of Birth (mm/dd/yyyy) as indicated and click Submit. You will be taken to the next sign-up page. 5. Create a GoTunes ID. This will be your GoTunes login ID and cannot be changed, so think of one that is secure and easy to remember. 6. Create a GoTunes password. You can change your password at any time. 7. Enter your Password Reset Question and Answer. This can be used at a later time if you forget your password. 8. Enter your e-mail address. You will receive e-mail notification when new information is available in [address] 9. Click Sign Up. You can now view and download portions of your medical record. 10. Click the Download Summary menu link to download a portable copy of your medical information. If you have questions, please visit the Frequently Asked Questions section of the GoTunes website. Remember, GoTunes is NOT to be used for urgent needs. For medical emergencies, dial 911. Now available from your iPhone and Android! Please provide this summary of care documentation to your next provider. Your primary care clinician is listed as Ananda Ba. If you have any questions after today's visit, please call [phone].

## 2018-01-26 NOTE — LETTER
Memory Seal 1955 1/26/2018 Dear Marisol Berg MD 
 
I had the pleasure of evaluating  Ms. Nafisa Rose in office today. Below are the relevant portions of my assessment and plan of care. ICD-10-CM ICD-9-CM 1. Chronic diastolic heart failure (HCC) I50.32 428.32   
 1/18 improved clinically 2. Pure hypercholesterolemia E78.00 272.0   
 5/17 LDL56; 12/16 LDL63; 7/15 LDL76; 5/14 LDL 96;  8/12 LDL 91; 
  
3. Essential hypertension, benign I10 401.1 valsartan (DIOVAN) 160 mg tablet  
   isosorbide mononitrate ER (IMDUR) 60 mg CR tablet 1/18 incr imdur; 5/17 Patient taking valsartan HCT. This was not a number of list and so was advised to stop taking it; Controlled 4. Atherosclerosis of native coronary artery of native heart without angina pectoris I25.10 414.01   
 1/18; 5/17; 11/16 Stable symptoms 5. Postsurgical percutaneous transluminal coronary angioplasty status Z98.61 V45.82   
 10/12 ALBA LCx 6. Tobacco use disorder F17.200 305.1 Patient advised to stop smoking to reduce future cardiovascular events. Current Outpatient Prescriptions Medication Sig Dispense Refill  valsartan (DIOVAN) 160 mg tablet Take  by mouth daily.  isosorbide mononitrate ER (IMDUR) 60 mg CR tablet Take 1 Tab by mouth every morning. 90 Tab 1  
 furosemide (LASIX) 20 mg tablet Take  by mouth daily.  nicotine (NICODERM CQ) 14 mg/24 hr patch 1 Patch by TransDERmal route every twenty-four (24) hours.  ibuprofen (MOTRIN) 800 mg tablet Take  by mouth.  buPROPion SR (WELLBUTRIN SR) 150 mg SR tablet Take 1 Tab by mouth two (2) times a day. 60 Tab 2  
 aspirin delayed-release 81 mg tablet Take 1 Tab by mouth daily. 100 Tab prn  NITROSTAT 0.4 mg SL tablet DISSOLVE 1 TABLET UNDER TONGUE EVERY FIVE MINUTES AS NEEDED FOR CHEST PAIN FOR UP TO 3 DOSES 25 Tab 1  
 diltiazem (TAZTIA XT) 240 mg SR capsule Take 240 mg by mouth daily.  fenofibrate (LOFIBRA) 160 mg tablet Take 160 mg by mouth daily.  pravastatin (PRAVACHOL) 40 mg tablet Take 1 Tab by mouth nightly. 30 Tab 6  
 albuterol (VENTOLIN HFA) 90 mcg/actuation inhaler Take  by inhalation. Orders Placed This Encounter  valsartan (DIOVAN) 160 mg tablet Sig: Take  by mouth daily.  isosorbide mononitrate ER (IMDUR) 60 mg CR tablet Sig: Take 1 Tab by mouth every morning. Dispense:  90 Tab Refill:  1 If you have questions, please do not hesitate to call me. I look forward to following Ms. Marion Chavez along with you. Sincerely, Isac Page MD

## 2018-01-26 NOTE — PROGRESS NOTES
HISTORY OF PRESENT ILLNESS  Blanca Coles is a 57 y.o. female. HPI Comments: F/u CAD, Old PCI, HTN, HLD  5/17; 11/16  Patient denies significant chest pain, SOB, palpitations, edema, dizziness      Shortness of Breath   The history is provided by the patient. This is a new problem. The problem occurs intermittently. The current episode started more than 1 week ago. The problem has been gradually improving. Pertinent negatives include no fever, no cough, no wheezing, no PND, no orthopnea, no vomiting, no rash, no leg swelling and no claudication. The problem's precipitants include exercise. CHF   The history is provided by the medical records. This is a chronic problem. Hypertension   The history is provided by the medical records. This is a chronic problem. Cholesterol Problem   The history is provided by the medical records. This is a chronic problem. Review of Systems   Constitutional: Negative for chills, fever, malaise/fatigue and weight loss. HENT: Negative for nosebleeds. Eyes: Negative for discharge. Respiratory: Negative for cough and wheezing. Cardiovascular: Negative for palpitations, orthopnea, claudication, leg swelling and PND. Gastrointestinal: Negative for diarrhea, nausea and vomiting. Genitourinary: Negative for dysuria and hematuria. Musculoskeletal: Negative for joint pain. Skin: Negative for rash. Neurological: Negative for dizziness, seizures and loss of consciousness. Endo/Heme/Allergies: Negative for polydipsia. Does not bruise/bleed easily. Psychiatric/Behavioral: Negative for depression and substance abuse. The patient does not have insomnia.       Allergies   Allergen Reactions    Codeine Other (comments)     States cannot remember it has been so long         Past Medical History:   Diagnosis Date    CAD (coronary artery disease)     Chronic diastolic heart failure (HCC)     Stable symptoms    Congestive heart failure, unspecified     Essential hypertension     Myocardial infarction     10/12 NSTEMI    Other and unspecified hyperlipidemia     8/12 LDL 80    Postsurgical percutaneous transluminal coronary angioplasty status     Tobacco use disorder     Weight loss, unintentional 7/27/2015    chk thyroid, cmp        Family History   Problem Relation Age of Onset    Heart Failure Mother      CHF in 45s    Stroke Mother      CVA in 46s    Heart Attack Neg Hx     Sudden Death Neg Hx        Social History   Substance Use Topics    Smoking status: Current Every Day Smoker     Packs/day: 0.50    Smokeless tobacco: Never Used      Comment: Smokes <1 pack of cigarettes per day; 10/12 - 3 cig/day    Alcohol use Yes      Comment: Occasional alcohol use;        Current Outpatient Prescriptions   Medication Sig    valsartan (DIOVAN) 160 mg tablet Take  by mouth daily.  furosemide (LASIX) 20 mg tablet Take  by mouth daily.  nicotine (NICODERM CQ) 14 mg/24 hr patch 1 Patch by TransDERmal route every twenty-four (24) hours.  ibuprofen (MOTRIN) 800 mg tablet Take  by mouth.  buPROPion SR (WELLBUTRIN SR) 150 mg SR tablet Take 1 Tab by mouth two (2) times a day.  aspirin delayed-release 81 mg tablet Take 1 Tab by mouth daily.  NITROSTAT 0.4 mg SL tablet DISSOLVE 1 TABLET UNDER TONGUE EVERY FIVE MINUTES AS NEEDED FOR CHEST PAIN FOR UP TO 3 DOSES    diltiazem (TAZTIA XT) 240 mg SR capsule Take 240 mg by mouth daily.  fenofibrate (LOFIBRA) 160 mg tablet Take 160 mg by mouth daily.  isosorbide mononitrate ER (IMDUR) 30 mg tablet Take  by mouth daily.  pravastatin (PRAVACHOL) 40 mg tablet Take 1 Tab by mouth nightly.  albuterol (VENTOLIN HFA) 90 mcg/actuation inhaler Take  by inhalation. No current facility-administered medications for this visit.          Past Surgical History:   Procedure Laterality Date    HX CORONARY STENT PLACEMENT      ALBA mid LCx    HX HEART CATHETERIZATION         Diagnostic Studies:  CARDIOLOGY STUDIES 10/1/2012 8/1/2012 4/1/2012   EKG Result - - SR, Poor R wave progression, no ac ST-T changes   Myocardial Perfusion Scan Result - nl scan, nl EF -   Cardiac Cath Result 70% D1, 99% mid LCx, 70% OM2, 50% distal RCA, ALBA mid LCx - -   Cardiac Cath with PCI Result ALBA LCx - -   Echocardiogram - Complete Result EF 65%, mild DD EF 65%, mild DD, trace MR -   Some recent data might be hidden   12/17 ECHO  ECHO CARDIOGRAM [facility]  Component Name Value Ref Range   EF Echo 60     Result Impression   :   NORMAL LEFT VENTRICULAR CAVITY SIZE AND SYSTOLIC FUNCTION WITH AN EJECTION FRACTION OF  60  %. MILD DIASTOLIC DYSFUNCTION. DILATED LEFT ATRIUM. NO HEMODYNAMICALLY SIGNIFICANT VALVULAR PATHOLOGY. ESTIMATED PULMONARY ARTERY PRESSURE OF 43 MMHG SUGGESTIVE OF MILD PULMONARY HYPERTENSION. COMPARED TO PREVIOUS STUDY DONE ON 10/18/12:  PULMONARY ARTERY PRESSURE HAS INCREASED FROM 21   MMHG TO 43 MMHG. 1/18 Nuc Stress  Conclusion:   1. Normal perfusion scan. 2. Normal wall motion and ejection fraction. 3. No evidence of significant fixed or reversible defect suggesting ischemia or myocardial infarction noted from this nuclear study. 4. Low risk scan. Visit Vitals    /69    Pulse 66    Ht 5' 3\" (1.6 m)    Wt 132 lb (59.9 kg)    BMI 23.38 kg/m2       Ms. Jacqueline Gregorio has a reminder for a \"due or due soon\" health maintenance. I have asked that she contact her primary care provider for follow-up on this health maintenance. Physical Exam   Constitutional: She is oriented to person, place, and time. She appears well-developed and well-nourished. No distress. HENT:   Head: Normocephalic and atraumatic. Mouth/Throat: Normal dentition. Eyes: Right eye exhibits no discharge. Left eye exhibits no discharge. No scleral icterus. Neck: Neck supple. No JVD present. Carotid bruit is not present. No thyromegaly present.    Cardiovascular: Normal rate, regular rhythm, S1 normal, S2 normal, normal heart sounds and intact distal pulses. Exam reveals no gallop and no friction rub. No murmur heard. Pulmonary/Chest: Effort normal. She has no wheezes. She has no rales. B/l reduced BS   Abdominal: Soft. She exhibits no mass. There is no tenderness. Musculoskeletal: She exhibits no edema. Lymphadenopathy:        Right cervical: No superficial cervical adenopathy present. Left cervical: No superficial cervical adenopathy present. Neurological: She is alert and oriented to person, place, and time. Skin: Skin is warm and dry. No rash noted. Psychiatric: She has a normal mood and affect. Her behavior is normal.       ASSESSMENT and PLAN          Diagnoses and all orders for this visit:    1. Chronic diastolic heart failure ([address]  Comments:  1/18 improved clinically    2. Pure hypercholesterolemia  Comments:  5/17 LDL56; 12/16 PSY24; 7/15 LDL76; 5/14 LDL 96;  8/12 LDL 91;      3. Essential hypertension, benign  Comments:  1/18 incr imdur; 5/17 Patient taking valsartan HCT. This was not a number of list and so was advised to stop taking it; Controlled     Orders:  -     isosorbide mononitrate ER (IMDUR) 60 mg CR tablet; Take 1 Tab by mouth every morning. 4. Atherosclerosis of native coronary artery of native heart without angina pectoris  Comments:  1/18; 5/17; 11/16 Stable symptoms    5. Postsurgical percutaneous transluminal coronary angioplasty status  Comments:  10/12 ALBA LCx      6. Tobacco use disorder  Comments:  Patient advised to stop smoking to reduce future cardiovascular events. Pertinent laboratory and test data reviewed and discussed with patient.   See patient instructions also for other medical advice given    Medications Discontinued During This Encounter   Medication Reason    valsartan (DIOVAN) 80 mg tablet Duplicate Order    diclofenac (VOLTAREN-XR) 100 mg ER tablet Not A Current Medication    isosorbide mononitrate ER (IMDUR) 30 mg tablet Reorder Follow-up Disposition:  Return in about 6 months (around 7/26/2018), or if symptoms worsen or fail to improve.

## 2018-01-26 NOTE — PATIENT INSTRUCTIONS
Medications Discontinued During This Encounter   Medication Reason    valsartan (DIOVAN) 80 mg tablet Duplicate Order    diclofenac (VOLTAREN-XR) 100 mg ER tablet Not A Current Medication    isosorbide mononitrate ER (IMDUR) 30 mg tablet Reorder       Orders Placed This Encounter    isosorbide mononitrate ER (IMDUR) 60 mg CR tablet     Sig: Take 1 Tab by mouth every morning. Dispense:  90 Tab     Refill:  1          Stopping Smoking: Care Instructions  Your Care Instructions    Cigarette smokers crave the nicotine in cigarettes. Giving it up is much harder than simply changing a habit. Your body has to stop craving the nicotine. It is hard to quit, but you can do it. There are many tools that people use to quit smoking. You may find that combining tools works best for you. There are several steps to quitting. First you get ready to quit. Then you get support to help you. After that, you learn new skills and behaviors to become a nonsmoker. For many people, a necessary step is getting and using medicine. Your doctor will help you set up the plan that best meets your needs. You may want to attend a smoking cessation program to help you quit smoking. When you choose a program, look for one that has proven success. Ask your doctor for ideas. You will greatly increase your chances of success if you take medicine as well as get counseling or join a cessation program.  Some of the changes you feel when you first quit tobacco are uncomfortable. Your body will miss the nicotine at first, and you may feel short-tempered and grumpy. You may have trouble sleeping or concentrating. Medicine can help you deal with these symptoms. You may struggle with changing your smoking habits and rituals. The last step is the tricky one: Be prepared for the smoking urge to continue for a time. This is a lot to deal with, but keep at it. You will feel better. Follow-up care is a key part of your treatment and safety.  Be sure to make and go to all appointments, and call your doctor if you are having problems. It's also a good idea to know your test results and keep a list of the medicines you take. How can you care for yourself at home? · Ask your family, friends, and coworkers for support. You have a better chance of quitting if you have help and support. · Join a support group, such as Nicotine Anonymous, for people who are trying to quit smoking. · Consider signing up for a smoking cessation program, such as the American Lung Association's Freedom from Smoking program.  · Set a quit date. Pick your date carefully so that it is not right in the middle of a big deadline or stressful time. Once you quit, do not even take a puff. Get rid of all ashtrays and lighters after your last cigarette. Clean your house and your clothes so that they do not smell of smoke. · Learn how to be a nonsmoker. Think about ways you can avoid those things that make you reach for a cigarette. ¨ Avoid situations that put you at greatest risk for smoking. For some people, it is hard to have a drink with friends without smoking. For others, they might skip a coffee break with coworkers who smoke. ¨ Change your daily routine. Take a different route to work or eat a meal in a different place. · Cut down on stress. Calm yourself or release tension by doing an activity you enjoy, such as reading a book, taking a hot bath, or gardening. · Talk to your doctor or pharmacist about nicotine replacement therapy, which replaces the nicotine in your body. You still get nicotine but you do not use tobacco. Nicotine replacement products help you slowly reduce the amount of nicotine you need. These products come in several forms, many of them available over-the-counter:  ¨ Nicotine patches  ¨ Nicotine gum and lozenges  ¨ Nicotine inhaler  · Ask your doctor about bupropion (Wellbutrin) or varenicline (Chantix), which are prescription medicines. They do not contain nicotine. They help you by reducing withdrawal symptoms, such as stress and anxiety. · Some people find hypnosis, acupuncture, and massage helpful for ending the smoking habit. · Eat a healthy diet and get regular exercise. Having healthy habits will help your body move past its craving for nicotine. · Be prepared to keep trying. Most people are not successful the first few times they try to quit. Do not get mad at yourself if you smoke again. Make a list of things you learned and think about when you want to try again, such as next week, next month, or next year. Where can you learn more? Go to http://velmaIntelliChemmichelle.info/. Enter F296 in the search box to learn more about \"Stopping Smoking: Care Instructions. \"  Current as of: March 20, 2017  Content Version: 11.4  © 6005-8559 [employer]. Care instructions adapted under license by [employer] (which disclaims liability or warranty for this information). If you have questions about a medical condition or this instruction, always ask your healthcare professional. [address] any warranty or liability for your use of this information.

## 2018-03-20 RX ORDER — NITROGLYCERIN 0.4 MG/1
TABLET SUBLINGUAL
Qty: 25 TAB | Refills: 0 | Status: SHIPPED | OUTPATIENT
Start: 2018-03-20 | End: 2019-02-16 | Stop reason: SDUPTHER

## 2018-07-13 ENCOUNTER — OFFICE VISIT (OUTPATIENT)
Dept: CARDIOLOGY CLINIC | Age: 63
End: 2018-07-13

## 2018-07-13 VITALS
DIASTOLIC BLOOD PRESSURE: 75 MMHG | HEIGHT: 63 IN | WEIGHT: 123 LBS | HEART RATE: 86 BPM | SYSTOLIC BLOOD PRESSURE: 149 MMHG | BODY MASS INDEX: 21.79 KG/M2

## 2018-07-13 DIAGNOSIS — Z98.61 POSTSURGICAL PERCUTANEOUS TRANSLUMINAL CORONARY ANGIOPLASTY STATUS: ICD-10-CM

## 2018-07-13 DIAGNOSIS — I50.32 CHRONIC DIASTOLIC HEART FAILURE (HCC): Primary | ICD-10-CM

## 2018-07-13 DIAGNOSIS — E78.00 PURE HYPERCHOLESTEROLEMIA: ICD-10-CM

## 2018-07-13 DIAGNOSIS — I25.10 ATHEROSCLEROSIS OF NATIVE CORONARY ARTERY OF NATIVE HEART WITHOUT ANGINA PECTORIS: ICD-10-CM

## 2018-07-13 DIAGNOSIS — R63.4 WEIGHT LOSS, UNINTENTIONAL: ICD-10-CM

## 2018-07-13 DIAGNOSIS — F17.200 TOBACCO USE DISORDER: ICD-10-CM

## 2018-07-13 DIAGNOSIS — I10 ESSENTIAL HYPERTENSION, BENIGN: ICD-10-CM

## 2018-07-13 NOTE — LETTER
Shari Ryan 1955 7/13/2018 Dear Salvador Park MD 
 
I had the pleasure of evaluating  Ms. Frankie Kennedy in office today. Below are the relevant portions of my assessment and plan of care. ICD-10-CM ICD-9-CM 1. Chronic diastolic heart failure (HCC) I50.32 428.32   
2. Essential hypertension, benign I10 401.1 3. Atherosclerosis of native coronary artery of native heart without angina pectoris I25.10 414.01   
4. Pure hypercholesterolemia E78.00 272.0 5. Postsurgical percutaneous transluminal coronary angioplasty status Z98.61 V45.82   
6. Tobacco use disorder F17.200 305.1 7. Weight loss, unintentional R63.4 783.21 Current Outpatient Prescriptions Medication Sig Dispense Refill  nitroglycerin (NITROSTAT) 0.4 mg SL tablet DISSOLVE 1 TABLET UNDER TONGUE EVERY 5 MINUTES AS NEEDED FOR CHEST PAIN FOR UP TO 3 DOSES 25 Tab 0  
 valsartan (DIOVAN) 160 mg tablet Take  by mouth daily.  isosorbide mononitrate ER (IMDUR) 60 mg CR tablet Take 1 Tab by mouth every morning. (Patient taking differently: Take 30 mg by mouth every morning.) 90 Tab 1  
 aspirin delayed-release 81 mg tablet Take 1 Tab by mouth daily. (Patient taking differently: Take 81 mg by mouth as needed.) 100 Tab prn  diltiazem (TAZTIA XT) 240 mg SR capsule Take 240 mg by mouth daily.  fenofibrate (LOFIBRA) 160 mg tablet Take 160 mg by mouth daily.  pravastatin (PRAVACHOL) 40 mg tablet Take 1 Tab by mouth nightly. 30 Tab 6  
 albuterol (VENTOLIN HFA) 90 mcg/actuation inhaler Take  by inhalation. No orders of the defined types were placed in this encounter. If you have questions, please do not hesitate to call me. I look forward to following Ms. Frankie Kennedy along with you. Sincerely, Dony Muro MD

## 2018-07-13 NOTE — PROGRESS NOTES
HISTORY OF PRESENT ILLNESS Veronica Larsen is a 57 y.o. female. HPI Comments: F/u CAD, Old PCI, HTN, HLD 
5/17; 11/16  Patient denies significant chest pain, SOB, palpitations, edema, dizziness CHF The history is provided by the medical records. This is a chronic problem. Pertinent negatives include no shortness of breath. Shortness of Breath The history is provided by the patient. This is a new problem. The problem occurs intermittently. The current episode started more than 1 week ago. The problem has been resolved. Pertinent negatives include no fever, no cough, no wheezing, no PND, no orthopnea, no vomiting, no rash, no leg swelling and no claudication. The problem's precipitants include exercise. Hypertension The history is provided by the medical records. This is a chronic problem. Pertinent negatives include no shortness of breath. Cholesterol Problem The history is provided by the medical records. This is a chronic problem. Pertinent negatives include no shortness of breath. Review of Systems Constitutional: Negative for chills, fever, malaise/fatigue and weight loss. HENT: Negative for nosebleeds. Eyes: Negative for discharge. Respiratory: Negative for cough, shortness of breath and wheezing. Cardiovascular: Negative for palpitations, orthopnea, claudication, leg swelling and PND. Gastrointestinal: Negative for diarrhea, nausea and vomiting. Genitourinary: Negative for dysuria and hematuria. Musculoskeletal: Negative for joint pain. Skin: Negative for rash. Neurological: Negative for dizziness, seizures and loss of consciousness. Endo/Heme/Allergies: Negative for polydipsia. Does not bruise/bleed easily. Psychiatric/Behavioral: Negative for depression and substance abuse. The patient does not have insomnia. Allergies Allergen Reactions  Codeine Other (comments) States cannot remember it has been so long Past Medical History: Diagnosis Date  CAD (coronary artery disease)  Chronic diastolic heart failure ([facility] [address] Stable symptoms  Congestive heart failure, unspecified  Essential hypertension  Myocardial infarction ([facility] [address]   
 10/12 NSTEMI  
 Other and unspecified hyperlipidemia 8/12 LDL 91  
 Postsurgical percutaneous transluminal coronary angioplasty status  Tobacco use disorder  Weight loss, unintentional 7/27/2015 chk thyroid, cmp Family History Problem Relation Age of Onset  Heart Failure Mother CHF in 45s  Stroke Mother CVA in 46s  Heart Attack Neg Hx  Sudden Death Neg Hx Social History Substance Use Topics  Smoking status: Current Every Day Smoker Packs/day: 0.50  Smokeless tobacco: Never Used Comment: Smokes <1 pack of cigarettes per day; 10/12 - 3 cig/day  Alcohol use Yes Comment: Occasional alcohol use;  
  
 
Current Outpatient Prescriptions Medication Sig  
 nitroglycerin (NITROSTAT) 0.4 mg SL tablet DISSOLVE 1 TABLET UNDER TONGUE EVERY 5 MINUTES AS NEEDED FOR CHEST PAIN FOR UP TO 3 DOSES  valsartan (DIOVAN) 160 mg tablet Take  by mouth daily.  isosorbide mononitrate ER (IMDUR) 60 mg CR tablet Take 1 Tab by mouth every morning. (Patient taking differently: Take 30 mg by mouth every morning.)  furosemide (LASIX) 20 mg tablet Take  by mouth as needed.  aspirin delayed-release 81 mg tablet Take 1 Tab by mouth daily. (Patient taking differently: Take 81 mg by mouth as needed.)  diltiazem (TAZTIA XT) 240 mg SR capsule Take 240 mg by mouth daily.  fenofibrate (LOFIBRA) 160 mg tablet Take 160 mg by mouth daily.  pravastatin (PRAVACHOL) 40 mg tablet Take 1 Tab by mouth nightly.  albuterol (VENTOLIN HFA) 90 mcg/actuation inhaler Take  by inhalation. No current facility-administered medications for this visit. Past Surgical History:  
Procedure Laterality Date  HX CORONARY STENT PLACEMENT    
 ALBA mid LCx  HX HEART CATHETERIZATION Diagnostic Studies: 
CARDIOLOGY STUDIES 10/1/2012 8/1/2012 4/1/2012 EKG Result - - SR, Poor R wave progression, no ac ST-T changes Myocardial Perfusion Scan Result - nl scan, nl EF -  
Cardiac Cath Result 70% D1, 99% mid LCx, 70% OM2, 50% distal RCA, ALBA mid LCx - -  
Cardiac Cath with PCI Result ALBA LCx - -  
Echocardiogram - Complete Result EF 65%, mild DD EF 65%, mild DD, trace MR - Some recent data might be hidden 12/17 ECHO 
ECHO CARDIOGRAM MGEXLFWM66/14/2017 [facility] Component Name Value Ref Range EF Echo 60    
Result Impression  
: 
 NORMAL LEFT VENTRICULAR CAVITY SIZE AND SYSTOLIC FUNCTION WITH AN EJECTION FRACTION OF  60  %. MILD DIASTOLIC DYSFUNCTION. DILATED LEFT ATRIUM. NO HEMODYNAMICALLY SIGNIFICANT VALVULAR PATHOLOGY. ESTIMATED PULMONARY ARTERY PRESSURE OF 43 MMHG SUGGESTIVE OF MILD PULMONARY HYPERTENSION. COMPARED TO PREVIOUS STUDY DONE ON 10/18/12:  PULMONARY ARTERY PRESSURE HAS INCREASED FROM 21 MMHG TO 43 MMHG. 1/18 Nuc Stress Conclusion: 1. Normal perfusion scan. 2. Normal wall motion and ejection fraction. 3. No evidence of significant fixed or reversible defect suggesting ischemia or myocardial infarction noted from this nuclear study. 4. Low risk scan. LIPID COMPLETE PANEL11/13/2017 [facility] Component Name Value Ref Range Cholesterol, Total 183 100 - 199 mg/dL Triglyceride 81 0 - 149 mg/dL Total HDL Cholesterol 79 >39 mg/dL VLDL CHOLESTEROL DANIEL 16 5 - 40 mg/dL LDL Cholesterol Calculation 88 Visit Vitals  /75  Pulse 86  Ht 5' 3\" (1.6 m)  Wt 123 lb (55.8 kg)  BMI 21.79 kg/m2 Ms. Raúl Sharma has a reminder for a \"due or due soon\" health maintenance. I have asked that she contact her primary care provider for follow-up on this health maintenance. Physical Exam  
Constitutional: She is oriented to person, place, and time.  She appears well-developed and well-nourished. No distress. HENT:  
Head: Normocephalic and atraumatic. Mouth/Throat: Normal dentition. Eyes: Right eye exhibits no discharge. Left eye exhibits no discharge. No scleral icterus. Neck: Neck supple. No JVD present. Carotid bruit is not present. No thyromegaly present. Cardiovascular: Normal rate, regular rhythm, S1 normal, S2 normal, normal heart sounds and intact distal pulses. Exam reveals no gallop and no friction rub. No murmur heard. Pulmonary/Chest: Effort normal. She has no wheezes. She has no rales. B/l reduced BS Abdominal: Soft. She exhibits no mass. There is no tenderness. Musculoskeletal: She exhibits no edema. Lymphadenopathy:  
     Right cervical: No superficial cervical adenopathy present. Left cervical: No superficial cervical adenopathy present. Neurological: She is alert and oriented to person, place, and time. Skin: Skin is warm and dry. No rash noted. Psychiatric: She has a normal mood and affect. Her behavior is normal.  
 
 
ASSESSMENT and PLAN 
 
 
10/12 ALBA LCx Stable cardiac status. Advised to stop smoking and it may help her gain weight. All the tests for weight loss done by Dr. Rachel Draper seem to be negative. Follow-up home BP chart to adjust medications if need be. Diagnoses and all orders for this visit: 
 
1. Chronic diastolic heart failure ([address] 2. Essential hypertension, benign 3. Atherosclerosis of native coronary artery of native heart without angina pectoris 4. Pure hypercholesterolemia 5. Postsurgical percutaneous transluminal coronary angioplasty status 6. Tobacco use disorder 7. Weight loss, unintentional 
 
 
 
Pertinent laboratory and test data reviewed and discussed with patient. See patient instructions also for other medical advice given Medications Discontinued During This Encounter Medication Reason  buPROPion SR (WELLBUTRIN SR) 150 mg SR tablet Therapy Completed  ibuprofen (MOTRIN) 800 mg tablet Not A Current Medication  nicotine (NICODERM CQ) 14 mg/24 hr patch Not A Current Medication  furosemide (LASIX) 20 mg tablet Therapy Completed Follow-up Disposition: 
Return in about 6 months (around 1/13/2019), or if symptoms worsen or fail to improve.

## 2018-07-13 NOTE — PROGRESS NOTES
1. Have you been to the ER, urgent care clinic since your last visit? Hospitalized since your last visit?     no    2. Have you seen or consulted any other health care providers outside of the [address] [place] [address] since your last visit? Include any pap smears or colon screening. Yes Where: pcp     3. Since your last visit, have you had any of the following symptoms?   no   4. Have you had any blood work, X-rays or cardiac testing? Yes Where: [facility]         5. Where do you normally have your labs drawn? [facility]  6. Do you need any refills today?    no

## 2018-07-13 NOTE — PATIENT INSTRUCTIONS
After the recommended changes have been made in blood pressure medicines, patient advised to keep BP/HR(pulse rate) chart twice daily and bring us results in next 2 weeks or so. Patient may send the results via \"My Chart\" if desired. Please rest for 5-10 minutes before checking blood pressure  Medications Discontinued During This Encounter   Medication Reason    buPROPion SR (WELLBUTRIN SR) 150 mg SR tablet Therapy Completed    ibuprofen (MOTRIN) 800 mg tablet Not A Current Medication    nicotine (NICODERM CQ) 14 mg/24 hr patch Not A Current Medication    furosemide (LASIX) 20 mg tablet Therapy Completed          Stopping Smoking: Care Instructions  Your Care Instructions  Cigarette smokers crave the nicotine in cigarettes. Giving it up is much harder than simply changing a habit. Your body has to stop craving the nicotine. It is hard to quit, but you can do it. There are many tools that people use to quit smoking. You may find that combining tools works best for you. There are several steps to quitting. First you get ready to quit. Then you get support to help you. After that, you learn new skills and behaviors to become a nonsmoker. For many people, a necessary step is getting and using medicine. Your doctor will help you set up the plan that best meets your needs. You may want to attend a smoking cessation program to help you quit smoking. When you choose a program, look for one that has proven success. Ask your doctor for ideas. You will greatly increase your chances of success if you take medicine as well as get counseling or join a cessation program.  Some of the changes you feel when you first quit tobacco are uncomfortable. Your body will miss the nicotine at first, and you may feel short-tempered and grumpy. You may have trouble sleeping or concentrating. Medicine can help you deal with these symptoms. You may struggle with changing your smoking habits and rituals.  The last step is the tricky one: Be prepared for the smoking urge to continue for a time. This is a lot to deal with, but keep at it. You will feel better. Follow-up care is a key part of your treatment and safety. Be sure to make and go to all appointments, and call your doctor if you are having problems. It's also a good idea to know your test results and keep a list of the medicines you take. How can you care for yourself at home? · Ask your family, friends, and coworkers for support. You have a better chance of quitting if you have help and support. · Join a support group, such as Nicotine Anonymous, for people who are trying to quit smoking. · Consider signing up for a smoking cessation program, such as the American Lung Association's Freedom from Smoking program.  · Get text messaging support. Go to the website at www.smokefree. gov to sign up for the [facility] program.  · Set a quit date. Pick your date carefully so that it is not right in the middle of a big deadline or stressful time. Once you quit, do not even take a puff. Get rid of all ashtrays and lighters after your last cigarette. Clean your house and your clothes so that they do not smell of smoke. · Learn how to be a nonsmoker. Think about ways you can avoid those things that make you reach for a cigarette. ¨ Avoid situations that put you at greatest risk for smoking. For some people, it is hard to have a drink with friends without smoking. For others, they might skip a coffee break with coworkers who smoke. ¨ Change your daily routine. Take a different route to work or eat a meal in a different place. · Cut down on stress. Calm yourself or release tension by doing an activity you enjoy, such as reading a book, taking a hot bath, or gardening. · Talk to your doctor or pharmacist about nicotine replacement therapy, which replaces the nicotine in your body.  You still get nicotine but you do not use tobacco. Nicotine replacement products help you slowly reduce the amount of nicotine you need. These products come in several forms, many of them available over-the-counter:  ¨ Nicotine patches  ¨ Nicotine gum and lozenges  ¨ Nicotine inhaler  · Ask your doctor about bupropion (Wellbutrin) or varenicline (Chantix), which are prescription medicines. They do not contain nicotine. They help you by reducing withdrawal symptoms, such as stress and anxiety. · Some people find hypnosis, acupuncture, and massage helpful for ending the smoking habit. · Eat a healthy diet and get regular exercise. Having healthy habits will help your body move past its craving for nicotine. · Be prepared to keep trying. Most people are not successful the first few times they try to quit. Do not get mad at yourself if you smoke again. Make a list of things you learned and think about when you want to try again, such as next week, next month, or next year. Where can you learn more? Go to http://velma-michelle.info/. Enter G174 in the search box to learn more about \"Stopping Smoking: Care Instructions. \"  Current as of: November 29, 2017  Content Version: 11.7  © 8823-9922 [employer]. Care instructions adapted under license by [employer] (which disclaims liability or warranty for this information). If you have questions about a medical condition or this instruction, always ask your healthcare professional. [address] any warranty or liability for your use of this information.

## 2018-07-13 NOTE — MR AVS SNAPSHOT
[address] [facility] 
 
 
 [address] [facility] 
[phone] Patient: Jennifer Benítez MRN: [id number] BDO:1/84/1606 Visit Information Date & Time Provider Department Dept. Phone Encounter #  
 7/13/2018 10:30 AM Carter Wasserman MD [facility] [phone] Follow-up Instructions Return in about 6 months (around 1/13/2019), or if symptoms worsen or fail to improve. Upcoming Health Maintenance Date Due Hepatitis C Screening 1955 Pneumococcal 19-64 Medium Risk (1 of 1 - PPSV23) 8/17/1972 DTaP/Tdap/Td series (1 - Tdap) 8/17/1974 PAP AKA CERVICAL CYTOLOGY 8/17/1974 BREAST CANCER SCRN MAMMOGRAM 8/17/2003 FOBT Q 1 YEAR AGE 50-75 8/17/2003 ZOSTER VACCINE AGE 60> 6/17/2013 Bone Densitometry (Dexa) Screening 8/17/2018 Influenza Age 5 to Adult 8/1/2018 Allergies as of 7/13/2018  Review Complete On: 7/13/2018 By: Carter Wasserman MD  
  
 Severity Noted Reaction Type Reactions Codeine  10/26/2012    Other (comments) States cannot remember it has been so long Current Immunizations  Never Reviewed No immunizations on file. Not reviewed this visit You Were Diagnosed With   
  
 Codes Comments Chronic diastolic heart failure (HCC)    -  Primary ICD-10-CM: I50.32 
ICD-9-CM: 428.32 Essential hypertension, benign     ICD-10-CM: I10 
ICD-9-CM: 401.1 Atherosclerosis of native coronary artery of native heart without angina pectoris     ICD-10-CM: I25.10 ICD-9-CM: 414.01   
 Pure hypercholesterolemia     ICD-10-CM: E78.00 ICD-9-CM: 272.0 Postsurgical percutaneous transluminal coronary angioplasty status     ICD-10-CM: Z98.61 ICD-9-CM: V45.82 Tobacco use disorder     ICD-10-CM: F17.200 ICD-9-CM: 305.1 Weight loss, unintentional     ICD-10-CM: R63.4 ICD-9-CM: 783.21 Vitals  BP Pulse Height(growth percentile) Weight(growth percentile) BMI Smoking Status 149/75 86 5' 3\" (1.6 m) 123 lb (55.8 kg) 21.79 kg/m2 Current Every Day Smoker Vitals History BMI and BSA Data Body Mass Index Body Surface Area 21.79 kg/m 2 1.57 m 2 Preferred Pharmacy Pharmacy Name Phone [facility]ACY #[id number] Viviane Mcgarry [address] Your Updated Medication List  
  
   
This list is accurate as of 7/13/18 11:05 AM.  Always use your most recent med list.  
  
  
  
  
 aspirin delayed-release 81 mg tablet Take 1 Tab by mouth daily. fenofibrate 160 mg tablet Commonly known as:  LOFIBRA Take 160 mg by mouth daily. isosorbide mononitrate ER 60 mg CR tablet Commonly known as:  IMDUR Take 1 Tab by mouth every morning. nitroglycerin 0.4 mg SL tablet Commonly known as:  NITROSTAT  
DISSOLVE 1 TABLET UNDER TONGUE EVERY 5 MINUTES AS NEEDED FOR CHEST PAIN FOR UP TO 3 DOSES  
  
 pravastatin 40 mg tablet Commonly known as:  PRAVACHOL Take 1 Tab by mouth nightly. TAZTIA  mg SR capsule Generic drug:  diltiazem Take 240 mg by mouth daily. valsartan 160 mg tablet Commonly known as:  DIOVAN Take  by mouth daily. VENTOLIN HFA 90 mcg/actuation inhaler Generic drug:  albuterol Take  by inhalation. Follow-up Instructions Return in about 6 months (around 1/13/2019), or if symptoms worsen or fail to improve. Patient Instructions After the recommended changes have been made in blood pressure medicines, patient advised to keep BP/HR(pulse rate) chart twice daily and bring us results in next 2 weeks or so. Patient may send the results via \"My Chart\" if desired. Please rest for 5-10 minutes before checking blood pressure Medications Discontinued During This Encounter Medication Reason  buPROPion SR (WELLBUTRIN SR) 150 mg SR tablet Therapy Completed  ibuprofen (MOTRIN) 800 mg tablet Not A Current Medication  nicotine (NICODERM CQ) 14 mg/24 hr patch Not A Current Medication  furosemide (LASIX) 20 mg tablet Therapy Completed Stopping Smoking: Care Instructions Your Care Instructions Cigarette smokers crave the nicotine in cigarettes. Giving it up is much harder than simply changing a habit. Your body has to stop craving the nicotine. It is hard to quit, but you can do it. There are many tools that people use to quit smoking. You may find that combining tools works best for you. There are several steps to quitting. First you get ready to quit. Then you get support to help you. After that, you learn new skills and behaviors to become a nonsmoker. For many people, a necessary step is getting and using medicine. Your doctor will help you set up the plan that best meets your needs. You may want to attend a smoking cessation program to help you quit smoking. When you choose a program, look for one that has proven success. Ask your doctor for ideas. You will greatly increase your chances of success if you take medicine as well as get counseling or join a cessation program. 
Some of the changes you feel when you first quit tobacco are uncomfortable. Your body will miss the nicotine at first, and you may feel short-tempered and grumpy. You may have trouble sleeping or concentrating. Medicine can help you deal with these symptoms. You may struggle with changing your smoking habits and rituals. The last step is the tricky one: Be prepared for the smoking urge to continue for a time. This is a lot to deal with, but keep at it. You will feel better. Follow-up care is a key part of your treatment and safety. Be sure to make and go to all appointments, and call your doctor if you are having problems. It's also a good idea to know your test results and keep a list of the medicines you take. How can you care for yourself at home? · Ask your family, friends, and coworkers for support.  You have a better chance of quitting if you have help and support. · Join a support group, such as Nicotine Anonymous, for people who are trying to quit smoking. · Consider signing up for a smoking cessation program, such as the American Lung Association's Freedom from Smoking program. 
· Get text messaging support. Go to the website at www.smokefree. gov to sign up for the  program. 
· Set a quit date. Pick your date carefully so that it is not right in the middle of a big deadline or stressful time. Once you quit, do not even take a puff. Get rid of all ashtrays and lighters after your last cigarette. Clean your house and your clothes so that they do not smell of smoke. · Learn how to be a nonsmoker. Think about ways you can avoid those things that make you reach for a cigarette. ¨ Avoid situations that put you at greatest risk for smoking. For some people, it is hard to have a drink with friends without smoking. For others, they might skip a coffee break with coworkers who smoke. ¨ Change your daily routine. Take a different route to work or eat a meal in a different place. · Cut down on stress. Calm yourself or release tension by doing an activity you enjoy, such as reading a book, taking a hot bath, or gardening. · Talk to your doctor or pharmacist about nicotine replacement therapy, which replaces the nicotine in your body. You still get nicotine but you do not use tobacco. Nicotine replacement products help you slowly reduce the amount of nicotine you need. These products come in several forms, many of them available over-the-counter: ¨ Nicotine patches ¨ Nicotine gum and lozenges ¨ Nicotine inhaler · Ask your doctor about bupropion (Wellbutrin) or varenicline (Chantix), which are prescription medicines. They do not contain nicotine. They help you by reducing withdrawal symptoms, such as stress and anxiety. · Some people find hypnosis, acupuncture, and massage helpful for ending the smoking habit. · Eat a healthy diet and get regular exercise. Having healthy habits will help your body move past its craving for nicotine. · Be prepared to keep trying. Most people are not successful the first few times they try to quit. Do not get mad at yourself if you smoke again. Make a list of things you learned and think about when you want to try again, such as next week, next month, or next year. Where can you learn more? Go to http://velma-michelle.info/. Enter H454 in the search box to learn more about \"Stopping Smoking: Care Instructions. \" Current as of: November 29, 2017 Content Version: 11.7 © 3791-0251 [employer]. Care instructions adapted under license by [employer] (which disclaims liability or warranty for this information). If you have questions about a medical condition or this instruction, always ask your healthcare professional. [place] [address] any warranty or liability for your use of this information. Introducing [facility] [place] [facility]! [facility] introduces Supercell patient portal. Now you can access parts of your medical record, email your doctor's office, and request medication refills online. 1. In your internet browser, go to [email or web address] 2. Click on the First Time User? Click Here link in the Sign In box. You will see the New Member Sign Up page. 3. Enter your Supercell Access Code exactly as it appears below. You will not need to use this code after youve completed the sign-up process. If you do not sign up before the expiration date, you must request a new code. · Supercell Access Code: [email or web address] Expires: 10/11/2018 10:18 AM 
 
4. Enter the last four digits of your Social Security Number (xxxx) and Date of Birth (mm/dd/yyyy) as indicated and click Submit. You will be taken to the next sign-up page. 5. Create a Supercell ID.  This will be your Supercell login ID and cannot be changed, so think of one that is secure and easy to remember. 6. Create a [employer] password. You can change your password at any time. 7. Enter your Password Reset Question and Answer. This can be used at a later time if you forget your password. 8. Enter your e-mail address. You will receive e-mail notification when new information is available in [address] 9. Click Sign Up. You can now view and download portions of your medical record. 10. Click the Download Summary menu link to download a portable copy of your medical information. If you have questions, please visit the Frequently Asked Questions section of the [employer] website. Remember, [employer] is NOT to be used for urgent needs. For medical emergencies, dial 911. Now available from your iPhone and Android! Please provide this summary of care documentation to your next provider. Your primary care clinician is listed as Kathy Beckwith. If you have any questions after today's visit, please call [phone].

## 2019-02-18 RX ORDER — NITROGLYCERIN 0.4 MG/1
TABLET SUBLINGUAL
Qty: 25 TAB | Refills: 0 | Status: SHIPPED | OUTPATIENT
Start: 2019-02-18

## 2021-04-08 ENCOUNTER — OFFICE VISIT (OUTPATIENT)
Dept: CARDIOLOGY CLINIC | Age: 66
End: 2021-04-08
Payer: MEDICARE

## 2021-04-08 VITALS
HEART RATE: 67 BPM | SYSTOLIC BLOOD PRESSURE: 144 MMHG | DIASTOLIC BLOOD PRESSURE: 70 MMHG | BODY MASS INDEX: 22.36 KG/M2 | HEIGHT: 63 IN | WEIGHT: 126.2 LBS

## 2021-04-08 DIAGNOSIS — Z01.810 PREOPERATIVE CARDIOVASCULAR EXAMINATION: ICD-10-CM

## 2021-04-08 DIAGNOSIS — Z95.5 HISTORY OF CORONARY ARTERY STENT PLACEMENT: ICD-10-CM

## 2021-04-08 DIAGNOSIS — I10 ESSENTIAL HYPERTENSION, BENIGN: ICD-10-CM

## 2021-04-08 DIAGNOSIS — I50.32 CHRONIC DIASTOLIC HEART FAILURE (HCC): ICD-10-CM

## 2021-04-08 DIAGNOSIS — I25.118 ATHEROSCLEROSIS OF NATIVE CORONARY ARTERY OF NATIVE HEART WITH STABLE ANGINA PECTORIS (HCC): Primary | ICD-10-CM

## 2021-04-08 DIAGNOSIS — E78.00 PURE HYPERCHOLESTEROLEMIA: ICD-10-CM

## 2021-04-08 PROCEDURE — G8536 NO DOC ELDER MAL SCRN: HCPCS | Performed by: INTERNAL MEDICINE

## 2021-04-08 PROCEDURE — G8432 DEP SCR NOT DOC, RNG: HCPCS | Performed by: INTERNAL MEDICINE

## 2021-04-08 PROCEDURE — 1101F PT FALLS ASSESS-DOCD LE1/YR: CPT | Performed by: INTERNAL MEDICINE

## 2021-04-08 PROCEDURE — G8427 DOCREV CUR MEDS BY ELIG CLIN: HCPCS | Performed by: INTERNAL MEDICINE

## 2021-04-08 PROCEDURE — 93000 ELECTROCARDIOGRAM COMPLETE: CPT | Performed by: INTERNAL MEDICINE

## 2021-04-08 PROCEDURE — 99214 OFFICE O/P EST MOD 30 MIN: CPT | Performed by: INTERNAL MEDICINE

## 2021-04-08 PROCEDURE — G8420 CALC BMI NORM PARAMETERS: HCPCS | Performed by: INTERNAL MEDICINE

## 2021-04-08 PROCEDURE — G8754 DIAS BP LESS 90: HCPCS | Performed by: INTERNAL MEDICINE

## 2021-04-08 PROCEDURE — G8400 PT W/DXA NO RESULTS DOC: HCPCS | Performed by: INTERNAL MEDICINE

## 2021-04-08 PROCEDURE — G8753 SYS BP > OR = 140: HCPCS | Performed by: INTERNAL MEDICINE

## 2021-04-08 PROCEDURE — 3017F COLORECTAL CA SCREEN DOC REV: CPT | Performed by: INTERNAL MEDICINE

## 2021-04-08 PROCEDURE — 1090F PRES/ABSN URINE INCON ASSESS: CPT | Performed by: INTERNAL MEDICINE

## 2021-04-08 RX ORDER — POTASSIUM CHLORIDE 20 MEQ/1
TABLET, EXTENDED RELEASE ORAL 2 TIMES DAILY
COMMUNITY

## 2021-04-08 RX ORDER — FUROSEMIDE 20 MG/1
TABLET ORAL DAILY
COMMUNITY

## 2021-04-08 RX ORDER — DILTIAZEM HYDROCHLORIDE 240 MG/1
CAPSULE, EXTENDED RELEASE ORAL DAILY
COMMUNITY
Start: 2020-07-15

## 2021-04-08 NOTE — PROGRESS NOTES
HISTORY OF PRESENT ILLNESS  Manolo Saba is a 77 y.o. female. F/u CAD, Old PCI, HTN, HLD  5/17; 11/16  Patient denies significant chest pain, SOB, palpitations, edema, dizziness  4/2021  Patient is reevaluated. She has history of CAD and prior PCI and here for evaluation of preoperative cardiac status. Patient complains of tightness in the chest particularly when she is stressed. Last for a few minutes. Is she has shortness of breath on exertion. Hospital Follow Up  The history is provided by the patient. This is a new problem. Associated symptoms include shortness of breath. Pertinent negatives include no chest pain and no headaches. Hypertension  The history is provided by the medical records. This is a chronic problem. Associated symptoms include shortness of breath. Pertinent negatives include no chest pain and no headaches. Cholesterol Problem  The history is provided by the medical records. This is a chronic problem. Associated symptoms include shortness of breath. Pertinent negatives include no chest pain and no headaches. Shortness of Breath  The history is provided by the patient. This is a new problem. The problem occurs intermittently. The problem has been gradually worsening. Pertinent negatives include no fever, no headaches, no cough, no wheezing, no PND, no orthopnea, no chest pain, no vomiting, no rash, no leg swelling and no claudication. The problem's precipitants include exercise. CHF  The history is provided by the medical records. This is a chronic problem. Associated symptoms include shortness of breath. Pertinent negatives include no chest pain and no headaches. Review of Systems   Constitutional: Negative for chills, fever, malaise/fatigue and weight loss. HENT: Negative for nosebleeds. Eyes: Negative for discharge. Respiratory: Positive for shortness of breath. Negative for cough and wheezing.     Cardiovascular: Negative for chest pain, palpitations, orthopnea, claudication, leg swelling and PND. Gastrointestinal: Negative for diarrhea, nausea and vomiting. Genitourinary: Negative for dysuria and hematuria. Musculoskeletal: Negative for joint pain. Skin: Negative for rash. Neurological: Negative for dizziness, seizures, loss of consciousness and headaches. Endo/Heme/Allergies: Negative for polydipsia. Does not bruise/bleed easily. Psychiatric/Behavioral: Negative for depression and substance abuse. The patient does not have insomnia. Allergies   Allergen Reactions    Codeine Other (comments)     States cannot remember it has been so long         Past Medical History:   Diagnosis Date    CAD (coronary artery disease)     Chronic diastolic heart failure (HCC)     Stable symptoms    Congestive heart failure, unspecified     Essential hypertension     Myocardial infarction ([facility] [address]     10/12 NSTEMI    Other and unspecified hyperlipidemia     8/12 LDL 91    Postsurgical percutaneous transluminal coronary angioplasty status     Tobacco use disorder     Weight loss, unintentional 7/27/2015    chk thyroid, cmp        Family History   Problem Relation Age of Onset    Heart Failure Mother         CHF in 45s    Stroke Mother         CVA in 46s    Heart Attack Neg Hx     Sudden Death Neg Hx        Social History     Tobacco Use    Smoking status: Current Every Day Smoker     Packs/day: 0.50    Smokeless tobacco: Never Used    Tobacco comment: Smokes <1 pack of cigarettes per day; 10/12 - 3 cig/day   Substance Use Topics    Alcohol use: Yes     Comment: Occasional alcohol use;    Drug use: No        Current Outpatient Medications   Medication Sig    nitroglycerin (NITROSTAT) 0.4 mg SL tablet DISSOLVE 1 TABLET UNDER TONGUE EVERY 5 MINUTES AS NEEDED FOR CHEST PAIN FOR UP TO 3 DOSES    valsartan (DIOVAN) 160 mg tablet Take  by mouth daily.  isosorbide mononitrate ER (IMDUR) 60 mg CR tablet Take 1 Tab by mouth every morning.  (Patient taking differently: Take 30 mg by mouth every morning.)    aspirin delayed-release 81 mg tablet Take 1 Tab by mouth daily. (Patient taking differently: Take 81 mg by mouth as needed.)    diltiazem (TAZTIA XT) 240 mg SR capsule Take 240 mg by mouth daily.  fenofibrate (LOFIBRA) 160 mg tablet Take 160 mg by mouth daily.  pravastatin (PRAVACHOL) 40 mg tablet Take 1 Tab by mouth nightly.  albuterol (VENTOLIN HFA) 90 mcg/actuation inhaler Take  by inhalation. No current facility-administered medications for this visit. Past Surgical History:   Procedure Laterality Date    HX CORONARY STENT PLACEMENT      ALBA mid LCx    HX HEART CATHETERIZATION         Diagnostic Studies:  No flowsheet data found. There were no vitals taken for this visit. Ms. Alirio Bautista has a reminder for a \"due or due soon\" health maintenance. I have asked that she contact her primary care provider for follow-up on this health maintenance. Physical Exam   Constitutional: She is oriented to person, place, and time. She appears well-developed and well-nourished. No distress. HENT:   Head: Normocephalic and atraumatic. Mouth/Throat: Normal dentition. Eyes: Right eye exhibits no discharge. Left eye exhibits no discharge. No scleral icterus. Neck: Neck supple. No JVD present. Carotid bruit is not present. No thyromegaly present. Cardiovascular: Normal rate, regular rhythm, S1 normal, S2 normal, normal heart sounds and intact distal pulses. Exam reveals no gallop and no friction rub. No murmur heard. Pulmonary/Chest: Effort normal. She has wheezes (minimal b/l diffuse exp). She has no rales. Abdominal: Soft. She exhibits no mass. There is no abdominal tenderness. Musculoskeletal:         General: No edema. Lymphadenopathy:        Right cervical: No superficial cervical adenopathy present. Left cervical: No superficial cervical adenopathy present.    Neurological: She is alert and oriented to person, place, and time. Skin: Skin is warm and dry. No rash noted. Psychiatric: She has a normal mood and affect. Her behavior is normal.     CARDIOLOGY STUDIES 10/1/2012 2012 2012   EKG Result - - SR, Poor R wave progression, no ac ST-T changes   Myocardial Perfusion Scan Result - nl scan, nl EF -   Cardiac Cath Result 70% D1, 99% mid LCx, 70% OM2, 50% distal RCA, ALBA mid LCx - -   Cardiac Cath with PCI Result ALBA LCx - -   Echocardiogram - Complete Result EF 65%, mild DD EF 65%, mild DD, trace MR -        NUCLEAR IMAGIN    Findings:   1. Stress images reveal normal Myoview distrubution in all the LV segments in short axis, vertical and horizontal long axis views. 2. Resting images have a normal uptake. 3. Gated images reveal normal wall motion and the ejection fraction is calculated to be 85%. Conclusion:   1. Normal perfusion scan. 2. Normal wall motion and ejection fraction. 3. No evidence of significant fixed or reversible defect suggesting ischemia or myocardial infarction noted from this nuclear study. 4. Low risk scan. Ms. Jazmine Keen has a reminder for a \"due or due soon\" health maintenance. I have asked that she contact her primary care provider for follow-up on this health maintenance. No flowsheet data found. I have personally reviewed patient's records available from hospital and other providers and incorporated findings in patient care. 2021  Lab, EKG, note, stress test-  Impression - ABNORMAL ECG -2020     Impression EAR-Ectopic atrial rhythm-abnormal P axis, normal rate     Impression AMIQ-Anterior infarct, age indeterminate-Q >35mS, T neg, in V2-V5          Assessment         ICD-10-CM ICD-9-CM    1. Chronic diastolic heart failure (HCC)  I50.32 428.32    2. Essential hypertension, benign  I10 401.1    3. Pure hypercholesterolemia  E78.00 272.0    4. Atherosclerosis of native coronary artery of native heart without angina pectoris  I25.10 414.01    5.  Preoperative cardiovascular examination  Z01.810 V72.81    4/2021  Seen for evaluation for preoperative cardiac assessment. Seen in past medical group for CAD and prior stents. She has stress-induced angina. Also shortness of breath check echo and stress test.  Clearance based on that  There are no discontinued medications. No orders of the defined types were placed in this encounter. There are no discontinued medications.

## 2021-04-14 ENCOUNTER — TELEPHONE (OUTPATIENT)
Dept: CARDIOLOGY CLINIC | Age: 66
End: 2021-04-14

## 2021-04-14 NOTE — TELEPHONE ENCOUNTER
Patient is scheduled to have Bladder surgery on 4/23 and patient had nuclear done on 4/9 can patient be cleared to have surgery. Please Advise.

## 2021-04-14 NOTE — LETTER
2021 
[address] [facility] Re: Shivani Schuster : 1955 Ms. Km Denise is cleared from a cardiac standpoint with low risk for bladder surgery scheduled on 2021. If you have any questions or any further assistance is needed please contact our office. Sincerely, Signed By: Elliott Kent. Martha Cho MD  
 2021 Brian Cho M.D.  
 
cc:  Diana Good MD

## 2022-03-04 ENCOUNTER — OFFICE VISIT (OUTPATIENT)
Dept: CARDIOLOGY CLINIC | Age: 67
End: 2022-03-04
Payer: MEDICARE

## 2022-03-04 VITALS
WEIGHT: 122 LBS | OXYGEN SATURATION: 96 % | DIASTOLIC BLOOD PRESSURE: 66 MMHG | SYSTOLIC BLOOD PRESSURE: 155 MMHG | HEIGHT: 63 IN | BODY MASS INDEX: 21.62 KG/M2 | HEART RATE: 72 BPM

## 2022-03-04 DIAGNOSIS — Z95.5 HISTORY OF CORONARY ARTERY STENT PLACEMENT: ICD-10-CM

## 2022-03-04 DIAGNOSIS — I50.32 CHRONIC DIASTOLIC HEART FAILURE (HCC): ICD-10-CM

## 2022-03-04 DIAGNOSIS — E78.00 PURE HYPERCHOLESTEROLEMIA: ICD-10-CM

## 2022-03-04 DIAGNOSIS — I25.119 ATHEROSCLEROSIS OF NATIVE CORONARY ARTERY OF NATIVE HEART WITH ANGINA PECTORIS (HCC): Primary | ICD-10-CM

## 2022-03-04 DIAGNOSIS — I10 ESSENTIAL HYPERTENSION, BENIGN: ICD-10-CM

## 2022-03-04 PROCEDURE — G8754 DIAS BP LESS 90: HCPCS | Performed by: INTERNAL MEDICINE

## 2022-03-04 PROCEDURE — G8510 SCR DEP NEG, NO PLAN REQD: HCPCS | Performed by: INTERNAL MEDICINE

## 2022-03-04 PROCEDURE — G8536 NO DOC ELDER MAL SCRN: HCPCS | Performed by: INTERNAL MEDICINE

## 2022-03-04 PROCEDURE — 1101F PT FALLS ASSESS-DOCD LE1/YR: CPT | Performed by: INTERNAL MEDICINE

## 2022-03-04 PROCEDURE — 3017F COLORECTAL CA SCREEN DOC REV: CPT | Performed by: INTERNAL MEDICINE

## 2022-03-04 PROCEDURE — G8400 PT W/DXA NO RESULTS DOC: HCPCS | Performed by: INTERNAL MEDICINE

## 2022-03-04 PROCEDURE — G8753 SYS BP > OR = 140: HCPCS | Performed by: INTERNAL MEDICINE

## 2022-03-04 PROCEDURE — G8420 CALC BMI NORM PARAMETERS: HCPCS | Performed by: INTERNAL MEDICINE

## 2022-03-04 PROCEDURE — 99214 OFFICE O/P EST MOD 30 MIN: CPT | Performed by: INTERNAL MEDICINE

## 2022-03-04 PROCEDURE — G8427 DOCREV CUR MEDS BY ELIG CLIN: HCPCS | Performed by: INTERNAL MEDICINE

## 2022-03-04 PROCEDURE — 1090F PRES/ABSN URINE INCON ASSESS: CPT | Performed by: INTERNAL MEDICINE

## 2022-03-04 RX ORDER — ISOSORBIDE MONONITRATE 30 MG/1
30 TABLET, EXTENDED RELEASE ORAL DAILY
COMMUNITY

## 2022-03-04 NOTE — PATIENT INSTRUCTIONS
Medications Discontinued During This Encounter   Medication Reason    isosorbide mononitrate ER (IMDUR) 60 mg CR tablet DOSE ADJUSTMENT          Learning About the Mediterranean Diet  What is the Mediterranean diet? The Mediterranean diet is a style of eating rather than a diet plan. It features foods eaten in [place], [place], [place] and [place], and other countries along the [facility]. It emphasizes eating foods like fish, fruits, vegetables, beans, high-fiber breads and whole grains, nuts, and olive oil. This style of eating includes limited red meat, cheese, and sweets. Why choose the Mediterranean diet? A Mediterranean-style diet may improve heart health. It contains more fat than other heart-healthy diets. But the fats are mainly from nuts, unsaturated oils (such as fish oils and olive oil), and certain nut or seed oils (such as canola, soybean, or flaxseed oil). These fats may help protect the heart and blood vessels. How can you get started on the Mediterranean diet? Here are some things you can do to switch to a more Mediterranean way of eating. What to eat  · Eat a variety of fruits and vegetables each day, such as grapes, blueberries, tomatoes, broccoli, peppers, figs, olives, spinach, eggplant, beans, lentils, and chickpeas. · Eat a variety of whole-grain foods each day, such as oats, brown rice, and whole wheat bread, pasta, and couscous. · Eat fish at least 2 times a week. Try tuna, salmon, mackerel, lake trout, herring, or sardines. · Eat moderate amounts of low-fat dairy products, such as milk, cheese, or yogurt. · Eat moderate amounts of poultry and eggs. · Choose healthy (unsaturated) fats, such as nuts, olive oil, and certain nut or seed oils like canola, soybean, and flaxseed. · Limit unhealthy (saturated) fats, such as butter, palm oil, and coconut oil. And limit fats found in animal products, such as meat and dairy products made with whole milk.  Try to eat red meat only a few times a month in very small amounts. · Limit sweets and desserts to only a few times a week. This includes sugar-sweetened drinks like soda. The Mediterranean diet may also include red wine with your meal1 glass each day for women and up to 2 glasses a day for men. Tips for eating at home  · Use herbs, spices, garlic, lemon zest, and citrus juice instead of salt to add flavor to foods. · Add avocado slices to your sandwich instead of aceves. · Have fish for lunch or dinner instead of red meat. Brush the fish with olive oil, and broil or grill it. · Sprinkle your salad with seeds or nuts instead of cheese. · Cook with olive or canola oil instead of butter or oils that are high in saturated fat. · Switch from 2% milk or whole milk to 1% or fat-free milk. · Dip raw vegetables in a vinaigrette dressing or hummus instead of dips made from mayonnaise or sour cream.  · Have a piece of fruit for dessert instead of a piece of cake. Try baked apples, or have some dried fruit. Tips for eating out  · Try broiled, grilled, baked, or poached fish instead of having it fried or breaded. · Ask your  to have your meals prepared with olive oil instead of butter. · Order dishes made with marinara sauce or sauces made from olive oil. Avoid sauces made from cream or mayonnaise. · Choose whole-grain breads, whole wheat pasta and pizza crust, brown rice, beans, and lentils. · Cut back on butter or margarine on bread. Instead, you can dip your bread in a small amount of olive oil. · Ask for a side salad or grilled vegetables instead of french fries or chips. Where can you learn more? Go to http://www.[employer].com/  Enter O407 in the search box to learn more about \"Learning About the Mediterranean Diet. \"  Current as of: December 17, 2020               Content Version: 13.0  © 6354-3722 [employer]wise[employer].    Care instructions adapted under license by 1-800-DENTIST (which disclaims liability or warranty for this information). If you have questions about a medical condition or this instruction, always ask your healthcare professional. [address] any warranty or liability for your use of this information.

## 2022-03-04 NOTE — PROGRESS NOTES
HISTORY OF PRESENT ILLNESS  Yanelis Curiel is a 74 y.o. female. F/u CAD, Old PCI, CHF, HTN, HLD  5/17; 11/16  Patient denies significant chest pain, SOB, palpitations, edema, dizziness  4/2021  Patient is reevaluated. She has history of CAD and prior PCI and here for evaluation of preoperative cardiac status. Patient complains of tightness in the chest particularly when she is stressed. Last for a few minutes. Is she has shortness of breath on exertion. Shortness of Breath  The history is provided by the patient. This is a new problem. The problem occurs intermittently. The problem has been gradually worsening. Associated symptoms include chest pain and leg swelling. Pertinent negatives include no fever, no headaches, no cough, no wheezing, no PND, no orthopnea, no vomiting, no rash and no claudication. The problem's precipitants include exercise (Cleaning at home;). Follow-up  Associated symptoms include chest pain and shortness of breath. Pertinent negatives include no headaches. Leg Swelling  The history is provided by the patient. This is a chronic problem. The current episode started more than 1 week ago. The problem occurs daily. Associated symptoms include chest pain and shortness of breath. Pertinent negatives include no headaches. The symptoms are aggravated by standing. The symptoms are relieved by medications and sleep. Chest Pain (Angina)   The history is provided by the patient. This is a recurrent problem. The current episode started more than 1 week ago (1/22). The pain is associated with eating food. The pain is present in the substernal region. The pain does not radiate. Associated symptoms include lower extremity edema and shortness of breath. Pertinent negatives include no claudication, no cough, no dizziness, no fever, no headaches, no malaise/fatigue, no nausea, no orthopnea, no palpitations, no PND and no vomiting. She has tried antacids for the symptoms.  The treatment provided moderate relief. Review of Systems   Constitutional: Negative for chills, fever, malaise/fatigue and weight loss. HENT: Negative for nosebleeds. Eyes: Negative for discharge. Respiratory: Positive for shortness of breath. Negative for cough and wheezing. Cardiovascular: Positive for chest pain and leg swelling. Negative for palpitations, orthopnea, claudication and PND. Gastrointestinal: Negative for diarrhea, nausea and vomiting. Genitourinary: Negative for dysuria and hematuria. Musculoskeletal: Negative for joint pain. Skin: Negative for rash. Neurological: Negative for dizziness, seizures, loss of consciousness and headaches. Endo/Heme/Allergies: Negative for polydipsia. Does not bruise/bleed easily. Psychiatric/Behavioral: Negative for depression and substance abuse. The patient does not have insomnia. Allergies   Allergen Reactions    Codeine Other (comments)     States cannot remember it has been so long         Past Medical History:   Diagnosis Date    CAD (coronary artery disease)     Chronic diastolic heart failure (HCC)     Stable symptoms    Congestive heart failure, unspecified     Essential hypertension     Myocardial infarction ([facility] [address]     10/12 NSTEMI    Other and unspecified hyperlipidemia     8/12 LDL 91    Postsurgical percutaneous transluminal coronary angioplasty status     Tobacco use disorder     Weight loss, unintentional 7/27/2015    chk thyroid, cmp        Family History   Problem Relation Age of Onset    Heart Failure Mother         CHF in 45s    Stroke Mother         CVA in 46s    Heart Attack Neg Hx     Sudden Death Neg Hx        Social History     Tobacco Use    Smoking status: Current Every Day Smoker     Packs/day: 0.50    Smokeless tobacco: Never Used    Tobacco comment: Smokes <1 pack of cigarettes per day; 10/12 - 3 cig/day   Substance Use Topics    Alcohol use: Yes     Comment: Occasional alcohol use;    Drug use:  No Current Outpatient Medications   Medication Sig    isosorbide mononitrate ER (IMDUR) 30 mg tablet Take 30 mg by mouth daily.  furosemide (LASIX) 20 mg tablet Take  by mouth daily.  potassium chloride (K-DUR, KLOR-CON) 20 mEq tablet Take  by mouth two (2) times a day.  dilTIAZem ER (Tiadylt ER) 240 mg capsule Take  by mouth daily.  nitroglycerin (NITROSTAT) 0.4 mg SL tablet DISSOLVE 1 TABLET UNDER TONGUE EVERY 5 MINUTES AS NEEDED FOR CHEST PAIN FOR UP TO 3 DOSES    valsartan (DIOVAN) 160 mg tablet Take  by mouth daily.  aspirin delayed-release 81 mg tablet Take 1 Tab by mouth daily. (Patient taking differently: Take 81 mg by mouth as needed.)    fenofibrate (LOFIBRA) 160 mg tablet Take 160 mg by mouth daily.  pravastatin (PRAVACHOL) 40 mg tablet Take 1 Tab by mouth nightly.  albuterol (VENTOLIN HFA) 90 mcg/actuation inhaler Take  by inhalation. No current facility-administered medications for this visit. Past Surgical History:   Procedure Laterality Date    HX CORONARY STENT PLACEMENT      ALBA mid LCx    HX HEART CATHETERIZATION         Diagnostic Studies:  1/22 echo  CONCLUSIONS     * Normal left ventricular cavity size and systolic function.     * LV ejection fraction 58% by biplane.     * Mild concentric left ventricular hypertrophy.     * Diastolic dysfunction is present.     * Severely dilated left atrium with bowing of the interatrial septum toward   the right.     * Normal right atrial size. 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     * There is mild to moderate mitral valve regurgitation.     * No other hemodynamically significant valvular disease.     * Estimated right ventricular systolic pressure is 30 mmHg.     * No mass, shunts, or thrombi.     * There is no pericardial effusion.      Comparison     * No significant change since prior study from 08/14/2018.   1/22 CTA chest  IMPRESSION     No evidence of pulmonary embolism. Emphysema. No focal lung consolidation or significant pleural effusion. Visit Vitals  BP (!) 155/66 (BP 1 Location: Left upper arm, BP Patient Position: Sitting, BP Cuff Size: Adult)   Pulse 72   Ht 5' 3\" (1.6 m)   Wt 55.3 kg (122 lb)   SpO2 96%   BMI 21.61 kg/m²       Ms. Edgardo Mcgregor has a reminder for a \"due or due soon\" health maintenance. I have asked that she contact her primary care provider for follow-up on this health maintenance. Physical Exam  Constitutional:       General: She is not in acute distress. Appearance: She is well-developed. HENT:      Head: Normocephalic and atraumatic. Mouth/Throat:      Dentition: Normal dentition. Eyes:      General: No scleral icterus. Right eye: No discharge. Left eye: No discharge. Neck:      Thyroid: No thyromegaly. Vascular: No carotid bruit or JVD. Cardiovascular:      Rate and Rhythm: Normal rate and regular rhythm. Pulses: Intact distal pulses. Heart sounds: Normal heart sounds, S1 normal and S2 normal. No murmur heard. No friction rub. No gallop. Pulmonary:      Effort: Pulmonary effort is normal.      Breath sounds: Wheezing (minimal b/l diffuse exp) present. No rales. Abdominal:      Palpations: Abdomen is soft. There is no mass. Tenderness: There is no abdominal tenderness. Musculoskeletal:      Cervical back: Neck supple. Right lower leg: No edema. Left lower leg: No edema. Lymphadenopathy:      Cervical:      Right cervical: No superficial cervical adenopathy. Left cervical: No superficial cervical adenopathy. Skin:     General: Skin is warm and dry. Findings: No rash. Neurological:      Mental Status: She is alert and oriented to person, place, and time.    Psychiatric:         Behavior: Behavior normal.       CARDIOLOGY STUDIES 10/1/2012 8/1/2012 4/1/2012   EKG Result - - SR, Poor R wave progression, no ac ST-T changes   Myocardial Perfusion Scan Result - nl scan, nl EF -   Cardiac Cath Result 70% D1, 99% mid LCx, 70% OM2, 50% distal RCA, ALBA mid LCx - -   Cardiac Cath with PCI Result ALBA LCx - -   Echocardiogram - Complete Result EF 65%, mild DD EF 65%, mild DD, trace MR -        NUCLEAR IMAGIN    Findings:   1. Stress images reveal normal Myoview distrubution in all the LV segments in short axis, vertical and horizontal long axis views. 2. Resting images have a normal uptake. 3. Gated images reveal normal wall motion and the ejection fraction is calculated to be 85%. Conclusion:   1. Normal perfusion scan. 2. Normal wall motion and ejection fraction. 3. No evidence of significant fixed or reversible defect suggesting ischemia or myocardial infarction noted from this nuclear study. 4. Low risk scan. Ms. Rhonda Leung has a reminder for a \"due or due soon\" health maintenance. I have asked that she contact her primary care provider for follow-up on this health maintenance. Assessment     Component 09/20/21 02/14/20 02/14/19 11/13/17 05/11/17 04/09/15   Cholesterol 147 162 183      Triglyceride 56 63 71 81 126    HDL 85 96 80 High       Cholesterol/HDL 1.7 1.7 2.3      Non-HDL Cholesterol 62 66       LDL CALCULATION 51 53 89   76   VLDL CALCULATION 11 13 14          Diagnoses and all orders for this visit:    1. Atherosclerosis of native coronary artery of native heart with angina pectoris (HCC)  -     NUCLEAR CARDIAC STRESS TEST; Future    2. Essential hypertension, benign    3. Chronic diastolic heart failure ([address]    4. History of coronary artery stent placement    5. Pure hypercholesterolemia        Pertinent laboratory and test data reviewed and discussed with patient.   See patient instructions also for other medical advice given    Medications Discontinued During This Encounter   Medication Reason    isosorbide mononitrate ER (IMDUR) 60 mg CR tablet DOSE ADJUSTMENT Follow-up and Dispositions    · Return in about 3 months (around 6/4/2022), or if symptoms worsen or fail to improve, for post test, BP log x 4-5 days post med changes. 3/4/2022 CAD seems to be slight mildly unstable with recurrent atypical chest pain. Get a stress test for ischemia  Blood pressure is elevated but she has not taken her medicines today. Check home chart. CHF is compensated NYHA class II  Lipids are controlled well. Continue statins. Diet discussed. Mediterranean diet guidelines given. Smoking cessation reinforced. She has cut down and is trying to stop.

## 2022-03-04 NOTE — PROGRESS NOTES
1. Have you been to the ER, urgent care clinic since your last visit? Hospitalized since your last visit? Yes Where: [facility] for UTI, acid reflux and shortness of breathe      2. Have you seen or consulted any other health care providers outside of the [address] [place] [address] since your last visit? Include any pap smears or colon screening. Yes Where: Dr[facility]    3. Since your last visit, have you had any of the following symptoms?        no     4. Have you had any blood work, X-rays or cardiac testing? No        5. Where do you normally have your labs drawn? [facility]    6. Do you need any refills today?    no

## 2022-03-07 ENCOUNTER — TELEPHONE (OUTPATIENT)
Dept: CARDIOLOGY CLINIC | Age: 67
End: 2022-03-07

## 2022-03-07 NOTE — TELEPHONE ENCOUNTER
PEER TO PEER REQUESTED, PT IS SCHEDULED FOR TOMORROW FOR NUC STRESS TEST, HOWEVER [facility] [employer] IS NOT APPROVING THIS TEST, UNTIL A PEER TO PEER IS DONE. THE NUMBER IS [phone], PATIENT ID# [id number], ASK FOR RADIOLOGY DEPARTMENT.   Silva Smith,